# Patient Record
Sex: FEMALE | Race: WHITE | NOT HISPANIC OR LATINO | Employment: FULL TIME | URBAN - METROPOLITAN AREA
[De-identification: names, ages, dates, MRNs, and addresses within clinical notes are randomized per-mention and may not be internally consistent; named-entity substitution may affect disease eponyms.]

---

## 2019-09-09 ENCOUNTER — OFFICE VISIT (OUTPATIENT)
Dept: FAMILY MEDICINE | Facility: CLINIC | Age: 24
End: 2019-09-09
Payer: COMMERCIAL

## 2019-09-09 VITALS
WEIGHT: 157.5 LBS | HEIGHT: 63 IN | SYSTOLIC BLOOD PRESSURE: 120 MMHG | HEART RATE: 74 BPM | TEMPERATURE: 98.4 F | DIASTOLIC BLOOD PRESSURE: 78 MMHG | OXYGEN SATURATION: 99 % | BODY MASS INDEX: 27.91 KG/M2 | RESPIRATION RATE: 16 BRPM

## 2019-09-09 DIAGNOSIS — F33.9 EPISODE OF RECURRENT MAJOR DEPRESSIVE DISORDER, UNSPECIFIED DEPRESSION EPISODE SEVERITY (H): ICD-10-CM

## 2019-09-09 DIAGNOSIS — Z11.4 ENCOUNTER FOR SCREENING FOR HIV: ICD-10-CM

## 2019-09-09 DIAGNOSIS — K59.00 CONSTIPATION, UNSPECIFIED CONSTIPATION TYPE: ICD-10-CM

## 2019-09-09 DIAGNOSIS — Z13.220 LIPID SCREENING: ICD-10-CM

## 2019-09-09 DIAGNOSIS — G44.209 TENSION HEADACHE: ICD-10-CM

## 2019-09-09 DIAGNOSIS — E28.2 PCOS (POLYCYSTIC OVARIAN SYNDROME): Primary | ICD-10-CM

## 2019-09-09 LAB — HBA1C MFR BLD: 5.4 % (ref 0–5.6)

## 2019-09-09 PROCEDURE — 87389 HIV-1 AG W/HIV-1&-2 AB AG IA: CPT | Performed by: FAMILY MEDICINE

## 2019-09-09 PROCEDURE — 36415 COLL VENOUS BLD VENIPUNCTURE: CPT | Performed by: FAMILY MEDICINE

## 2019-09-09 PROCEDURE — 80061 LIPID PANEL: CPT | Performed by: FAMILY MEDICINE

## 2019-09-09 PROCEDURE — 83036 HEMOGLOBIN GLYCOSYLATED A1C: CPT | Performed by: FAMILY MEDICINE

## 2019-09-09 PROCEDURE — 80048 BASIC METABOLIC PNL TOTAL CA: CPT | Performed by: FAMILY MEDICINE

## 2019-09-09 PROCEDURE — 99204 OFFICE O/P NEW MOD 45 MIN: CPT | Performed by: FAMILY MEDICINE

## 2019-09-09 RX ORDER — AMITRIPTYLINE HYDROCHLORIDE 10 MG/1
10 TABLET ORAL 2 TIMES DAILY PRN
COMMUNITY
End: 2019-09-09

## 2019-09-09 RX ORDER — CITALOPRAM HYDROBROMIDE 40 MG/1
40 TABLET ORAL DAILY
COMMUNITY
End: 2019-11-18

## 2019-09-09 RX ORDER — SPIRONOLACTONE 50 MG/1
50 TABLET, FILM COATED ORAL DAILY
COMMUNITY
End: 2019-11-18

## 2019-09-09 RX ORDER — AMITRIPTYLINE HYDROCHLORIDE 10 MG/1
10 TABLET ORAL AT BEDTIME
Qty: 30 TABLET | Refills: 1
Start: 2019-09-09 | End: 2019-11-18

## 2019-09-09 RX ORDER — CITALOPRAM HYDROBROMIDE 10 MG/1
10 TABLET ORAL DAILY
Start: 2019-09-09 | End: 2019-11-18

## 2019-09-09 RX ORDER — DROSPIRENONE AND ETHINYL ESTRADIOL 0.02-3(28)
1 KIT ORAL DAILY
Qty: 84 TABLET | Refills: 3 | Status: SHIPPED | OUTPATIENT
Start: 2019-09-09 | End: 2020-08-18

## 2019-09-09 RX ORDER — SPIRONOLACTONE 50 MG/1
50 TABLET, FILM COATED ORAL DAILY
Qty: 90 TABLET | Refills: 1 | Status: SHIPPED | OUTPATIENT
Start: 2019-09-09 | End: 2020-03-11

## 2019-09-09 RX ORDER — DROSPIRENONE AND ETHINYL ESTRADIOL 0.02-3(28)
1 KIT ORAL DAILY
COMMUNITY
End: 2019-11-18

## 2019-09-09 ASSESSMENT — ANXIETY QUESTIONNAIRES
5. BEING SO RESTLESS THAT IT IS HARD TO SIT STILL: NOT AT ALL
1. FEELING NERVOUS, ANXIOUS, OR ON EDGE: SEVERAL DAYS
IF YOU CHECKED OFF ANY PROBLEMS ON THIS QUESTIONNAIRE, HOW DIFFICULT HAVE THESE PROBLEMS MADE IT FOR YOU TO DO YOUR WORK, TAKE CARE OF THINGS AT HOME, OR GET ALONG WITH OTHER PEOPLE: SOMEWHAT DIFFICULT
GAD7 TOTAL SCORE: 7
3. WORRYING TOO MUCH ABOUT DIFFERENT THINGS: MORE THAN HALF THE DAYS
6. BECOMING EASILY ANNOYED OR IRRITABLE: SEVERAL DAYS
2. NOT BEING ABLE TO STOP OR CONTROL WORRYING: MORE THAN HALF THE DAYS
7. FEELING AFRAID AS IF SOMETHING AWFUL MIGHT HAPPEN: NOT AT ALL

## 2019-09-09 ASSESSMENT — PATIENT HEALTH QUESTIONNAIRE - PHQ9
SUM OF ALL RESPONSES TO PHQ QUESTIONS 1-9: 5
5. POOR APPETITE OR OVEREATING: SEVERAL DAYS

## 2019-09-09 ASSESSMENT — MIFFLIN-ST. JEOR: SCORE: 1419.58

## 2019-09-09 NOTE — LETTER
September 10, 2019      Jessica Sonia  [address]  [facility] [address]        Dear ,    We are writing to inform you of your test results.    Here are your results for your recent lab which was normal. Please call or message me if you have questions or concerns.     Resulted Orders   Hemoglobin A1c   Result Value Ref Range    Hemoglobin A1C 5.4 0 - 5.6 %      Comment:      Normal <5.7% Prediabetes 5.7-6.4%  Diabetes 6.5% or higher - adopted from ADA   consensus guidelines.         If you have any questions or concerns, please call the clinic at the number listed above.       Sincerely,        Emily Guo MD/nr

## 2019-09-09 NOTE — PROGRESS NOTES
"Subjective     Jessica Scott is a 24 year old female who presents to clinic today for the following health issues:    HPI   Establish care    MDD - She has been taking Celexa for around 4 years. She feels that symptoms are controlled. She takes medication once/month as it makes her feels sick. She has been taking it once/month for around 2 years. Symptoms controlled.   PCOS - She is currently taking spironolactone and metformin. Normal menstrua cycles.   She endorses BM twice/week. Stools are hard.     Tension headaches - She takes elavil 10 mg once every few months.   No missed doses for birth control. No history of migraines or DVT.     Reviewed and updated as needed this visit by Provider        Reviewed PMH, PSH, FH, Medication and Allergies.     Review of Systems   ROS COMP: Constitutional, HEENT, cardiovascular, pulmonary, GI, , musculoskeletal, neuro, skin, endocrine and psych systems are negative, except as otherwise noted.      Objective    There were no vitals taken for this visit.  There is no height or weight on file to calculate BMI.  Physical Exam   /78   Pulse 74   Temp 98.4  F (36.9  C) (Oral)   Resp 16   Ht 1.594 m (5' 2.75\")   Wt 71.4 kg (157 lb 8 oz)   LMP 08/23/2019 (Exact Date)   SpO2 99%   BMI 28.12 kg/m    GENERAL: healthy, alert and no distress  EYES: Eyes grossly normal to inspection  HENT: nose and mouth without ulcers or lesions  RESP: non labored   MS: no gross musculoskeletal defects noted, no edema  SKIN: no suspicious lesions or rashes  NEURO: Normal strength and tone, mentation intact and speech normal  PSYCH: mentation appears normal, affect normal    Diagnostic Test Results:  none         Assessment & Plan     1. PCOS (polycystic ovarian syndrome)  - Basic metabolic panel  - metFORMIN (GLUCOPHAGE) 500 MG tablet; Take 1 tablet (500 mg) by mouth daily (with breakfast)  Dispense: 90 tablet; Refill: 1  - spironolactone (ALDACTONE) 50 MG tablet; Take 1 tablet (50 mg) by " mouth daily  Dispense: 90 tablet; Refill: 1  - Hemoglobin A1c    2. Birth control  - drospirenone-ethinyl estradiol (SUN) 3-0.02 MG tablet; Take 1 tablet by mouth daily  Dispense: 84 tablet; Refill: 3    3. Episode of recurrent major depressive disorder, unspecified depression episode severity (H)  PHQ-9 SCORE 9/9/2019   PHQ-9 Total Score 5     - Pt is currently taking Celexa 40 mg once/week. We discussed that Celexa is a daily medication and she is currently taking around 1 mg/day. I offered to start her on 5 mg daily, patient requested on 10 mg daily. Advised to follow up in one month.   - citalopram (CELEXA) 10 MG tablet; Take 1 tablet (10 mg) by mouth daily    4. Tension headache  - Pt is currently taking Elavil one tablet every few months. We discussed again that elavil is more daily medication. If headaches reoccur I recommended rest, fluids, heat pad and excedrin migraine PRN.   - amitriptyline (ELAVIL) 10 MG tablet; Take 1 tablet (10 mg) by mouth At Bedtime  Dispense: 30 tablet; Refill: 1    5. Lipid screening  - Lipid Profile (Chol, Trig, HDL, LDL calc)    6. Encounter for screening for HIV  - HIV Antigen Antibody Combo    7. Constipation  - advised below  Increase vegetables, fruit and water intake. If symptoms do not improve then you can try metamucil daily.     Pt due for pap. She'll follow up in one month for physical.     Emily Guo MD  [facility]

## 2019-09-09 NOTE — PATIENT INSTRUCTIONS
Celexa decrease to 10 mg daily     Increase vegetables, fruit and water intake. If symptoms do not improve then you can try metamucil daily.     Headache - You can try Excedrin migraine if you do have headaches.

## 2019-09-10 LAB
ANION GAP SERPL CALCULATED.3IONS-SCNC: 7 MMOL/L (ref 3–14)
BUN SERPL-MCNC: 8 MG/DL (ref 7–30)
CALCIUM SERPL-MCNC: 9.4 MG/DL (ref 8.5–10.1)
CHLORIDE SERPL-SCNC: 105 MMOL/L (ref 94–109)
CHOLEST SERPL-MCNC: 207 MG/DL
CO2 SERPL-SCNC: 26 MMOL/L (ref 20–32)
CREAT SERPL-MCNC: 0.76 MG/DL (ref 0.52–1.04)
GFR SERPL CREATININE-BSD FRML MDRD: >90 ML/MIN/{1.73_M2}
GLUCOSE SERPL-MCNC: 88 MG/DL (ref 70–99)
HDLC SERPL-MCNC: 52 MG/DL
HIV 1+2 AB+HIV1 P24 AG SERPL QL IA: NONREACTIVE
LDLC SERPL CALC-MCNC: 111 MG/DL
NONHDLC SERPL-MCNC: 155 MG/DL
POTASSIUM SERPL-SCNC: 3.7 MMOL/L (ref 3.4–5.3)
SODIUM SERPL-SCNC: 138 MMOL/L (ref 133–144)
TRIGL SERPL-MCNC: 222 MG/DL

## 2019-09-10 ASSESSMENT — ANXIETY QUESTIONNAIRES: GAD7 TOTAL SCORE: 7

## 2019-11-18 ENCOUNTER — OFFICE VISIT (OUTPATIENT)
Dept: FAMILY MEDICINE | Facility: CLINIC | Age: 24
End: 2019-11-18
Payer: COMMERCIAL

## 2019-11-18 VITALS
TEMPERATURE: 98.4 F | OXYGEN SATURATION: 97 % | DIASTOLIC BLOOD PRESSURE: 58 MMHG | BODY MASS INDEX: 26.78 KG/M2 | SYSTOLIC BLOOD PRESSURE: 102 MMHG | WEIGHT: 150 LBS | HEART RATE: 74 BPM

## 2019-11-18 DIAGNOSIS — G44.209 TENSION HEADACHE: ICD-10-CM

## 2019-11-18 DIAGNOSIS — F33.9 EPISODE OF RECURRENT MAJOR DEPRESSIVE DISORDER, UNSPECIFIED DEPRESSION EPISODE SEVERITY (H): ICD-10-CM

## 2019-11-18 DIAGNOSIS — Z01.419 ENCOUNTER FOR GYNECOLOGICAL EXAMINATION WITHOUT ABNORMAL FINDING: ICD-10-CM

## 2019-11-18 PROCEDURE — 99214 OFFICE O/P EST MOD 30 MIN: CPT | Performed by: FAMILY MEDICINE

## 2019-11-18 PROCEDURE — G0145 SCR C/V CYTO,THINLAYER,RESCR: HCPCS | Performed by: FAMILY MEDICINE

## 2019-11-18 RX ORDER — CITALOPRAM HYDROBROMIDE 10 MG/1
10 TABLET ORAL DAILY
Qty: 90 TABLET | Refills: 1 | Status: SHIPPED | OUTPATIENT
Start: 2019-11-18 | End: 2020-08-18

## 2019-11-18 ASSESSMENT — PATIENT HEALTH QUESTIONNAIRE - PHQ9
10. IF YOU CHECKED OFF ANY PROBLEMS, HOW DIFFICULT HAVE THESE PROBLEMS MADE IT FOR YOU TO DO YOUR WORK, TAKE CARE OF THINGS AT HOME, OR GET ALONG WITH OTHER PEOPLE: SOMEWHAT DIFFICULT
SUM OF ALL RESPONSES TO PHQ QUESTIONS 1-9: 8
SUM OF ALL RESPONSES TO PHQ QUESTIONS 1-9: 8

## 2019-11-18 ASSESSMENT — ANXIETY QUESTIONNAIRES
GAD7 TOTAL SCORE: 6
7. FEELING AFRAID AS IF SOMETHING AWFUL MIGHT HAPPEN: NOT AT ALL
2. NOT BEING ABLE TO STOP OR CONTROL WORRYING: SEVERAL DAYS
6. BECOMING EASILY ANNOYED OR IRRITABLE: MORE THAN HALF THE DAYS
1. FEELING NERVOUS, ANXIOUS, OR ON EDGE: SEVERAL DAYS
GAD7 TOTAL SCORE: 6
4. TROUBLE RELAXING: SEVERAL DAYS
GAD7 TOTAL SCORE: 6
7. FEELING AFRAID AS IF SOMETHING AWFUL MIGHT HAPPEN: NOT AT ALL
5. BEING SO RESTLESS THAT IT IS HARD TO SIT STILL: NOT AT ALL
3. WORRYING TOO MUCH ABOUT DIFFERENT THINGS: SEVERAL DAYS

## 2019-11-18 NOTE — PROGRESS NOTES
Subjective     Jessica Scott is a 24 year old female who presents to clinic today for the following health issues:    HPI     MDD - She is currently taking Celexa 10 mg daily. No side effects.   Headaches - When she does get them, she'll take an Excedrin which helps.   Ok for influenza vaccine.   No previous abnormal pap smears.     Reviewed and updated as needed this visit by Provider         Review of Systems   ROS COMP: Constitutional, HEENT, cardiovascular, pulmonary, gi and gu systems are negative, except as otherwise noted.      Objective    There were no vitals taken for this visit.  There is no height or weight on file to calculate BMI.  Physical Exam   /58 (BP Location: Left arm, Patient Position: Sitting, Cuff Size: Adult Regular)   Pulse 74   Temp 98.4  F (36.9  C) (Oral)   Wt 68 kg (150 lb)   LMP 11/15/2019   SpO2 97%   BMI 26.78 kg/m    GENERAL: healthy, alert and no distress  EYES: Eyes grossly normal to inspection  HENT:nose and mouth without ulcers or lesions   (female): normal female external genitalia, normal urethral meatus, vaginal mucosa, normal cervix without masses or discharge  PSYCH: mentation appears normal, affect normal    Diagnostic Test Results:  Labs reviewed in Epic        Assessment & Plan     1. Episode of recurrent major depressive disorder, unspecified depression episode severity (H)  - stable, continue current regimen   - citalopram (CELEXA) 10 MG tablet; Take 1 tablet (10 mg) by mouth daily  Dispense: 90 tablet; Refill: 1    2. Encounter for gynecological examination without abnormal finding  - Pap imaged thin layer screen reflex to HPV if ASCUS - recommend age 25 - 29    3. Tension headaches  - stable, continue current as needed medication     Emily Guo MD  [facility]      Answers for HPI/ROS submitted by the patient on 11/18/2019   If you checked off any problems, how difficult have these problems made it for you to do your work, take  care of things at home, or get along with other people?: Somewhat difficult  PHQ9 TOTAL SCORE: 8  DANISH 7 TOTAL SCORE: 6

## 2019-11-19 ASSESSMENT — ANXIETY QUESTIONNAIRES: GAD7 TOTAL SCORE: 6

## 2019-11-20 LAB
COPATH REPORT: NORMAL
PAP: NORMAL

## 2020-03-06 DIAGNOSIS — E28.2 PCOS (POLYCYSTIC OVARIAN SYNDROME): ICD-10-CM

## 2020-03-06 NOTE — TELEPHONE ENCOUNTER
"Requested Prescriptions   Pending Prescriptions Disp Refills     metFORMIN (GLUCOPHAGE) 500 MG tablet [Pharmacy Med Name: METFORMIN HCL 500MG TABS]  Last Written Prescription Date:  9/9/2019  Last Fill Quantity: 90 tabs,  # refills: 1   Last office visit: 11/18/2019 with prescribing provider:  Sari   Future Office Visit:   90 tablet 1     Sig: TAKE ONE TABLET BY MOUTH ONCE DAILY WITH BREAKFAST       Biguanide Agents Failed - 3/6/2020 11:02 AM        Failed - Patient has had a Microalbumin in the past 15 mos.     No lab results found.          Passed - Blood pressure less than 140/90 in past 6 months     BP Readings from Last 3 Encounters:   11/18/19 102/58   09/09/19 120/78                 Passed - Patient has documented LDL within the past 12 mos.     Recent Labs   Lab Test 09/09/19  1558   *             Passed - Patient is age 10 or older        Passed - Patient has documented A1c within the specified period of time.     If HgbA1C is 8 or greater, it needs to be on file within the past 3 months.  If less than 8, must be on file within the past 6 months.     Recent Labs   Lab Test 09/09/19  1558   A1C 5.4             Passed - Patient's CR is NOT>1.4 OR Patient's EGFR is NOT<45 within past 12 mos.     Recent Labs   Lab Test 09/09/19  1558   GFRESTIMATED >90   GFRESTBLACK >90       Recent Labs   Lab Test 09/09/19  1558   CR 0.76             Passed - Patient does NOT have a diagnosis of CHF.        Passed - Medication is active on med list        Passed - Patient is not pregnant        Passed - Patient has not had a positive pregnancy test within the past 12 mos.         Passed - Recent (6 mo) or future (30 days) visit within the authorizing provider's specialty     Patient had office visit in the last 6 months or has a visit in the next 30 days with authorizing provider or within the authorizing provider's specialty.  See \"Patient Info\" tab in inbasket, or \"Choose Columns\" in Meds & Orders section of the " "refill encounter.            spironolactone (ALDACTONE) 50 MG tablet [Pharmacy Med Name: SPIRONOLACTONE 50MG TABS]  Last Written Prescription Date:  9/9/2019  Last Fill Quantity: 90 tabs,  # refills: 1   Last office visit: 11/18/2019 with prescribing provider:  Sari   Future Office Visit:   90 tablet 1     Sig: TAKE ONE TABLET BY MOUTH ONCE DAILY       Diuretics (Including Combos) Protocol Passed - 3/6/2020 11:02 AM        Passed - Blood pressure under 140/90 in past 12 months     BP Readings from Last 3 Encounters:   11/18/19 102/58   09/09/19 120/78                 Passed - Recent (12 mo) or future (30 days) visit within the authorizing provider's specialty     Patient has had an office visit with the authorizing provider or a provider within the authorizing providers department within the previous 12 mos or has a future within next 30 days. See \"Patient Info\" tab in inbasket, or \"Choose Columns\" in Meds & Orders section of the refill encounter.              Passed - Medication is active on med list        Passed - Patient is age 18 or older        Passed - No active pregancy on record        Passed - Normal serum creatinine on file in past 12 months     Recent Labs   Lab Test 09/09/19  1558   CR 0.76              Passed - Normal serum potassium on file in past 12 months     Recent Labs   Lab Test 09/09/19  1558   POTASSIUM 3.7                    Passed - Normal serum sodium on file in past 12 months     Recent Labs   Lab Test 09/09/19  1558                 Passed - No positive pregnancy test in past 12 months         "

## 2020-03-11 ENCOUNTER — HEALTH MAINTENANCE LETTER (OUTPATIENT)
Age: 25
End: 2020-03-11

## 2020-03-11 RX ORDER — SPIRONOLACTONE 50 MG/1
TABLET, FILM COATED ORAL
Qty: 90 TABLET | Refills: 0 | Status: SHIPPED | OUTPATIENT
Start: 2020-03-11 | End: 2020-06-23

## 2020-06-04 ENCOUNTER — TELEPHONE (OUTPATIENT)
Dept: FAMILY MEDICINE | Facility: CLINIC | Age: 25
End: 2020-06-04

## 2020-06-04 DIAGNOSIS — Z29.9 PREVENTIVE MEASURE: Primary | ICD-10-CM

## 2020-06-04 NOTE — TELEPHONE ENCOUNTER
Reason for Call:  Other call back    Detailed comments: pt called in and stated she would like a lab order so she can get her magnesium level checked     Phone Number Patient can be reached at: Cell number on file:    Telephone Information:   Mobile [phone]       Best Time: ASAP    Can we leave a detailed message on this number? YES    Call taken on 6/4/2020 at 1:41 PM by KASSIE Hutchinson  [place]   Patient Rep

## 2020-06-05 DIAGNOSIS — E28.2 PCOS (POLYCYSTIC OVARIAN SYNDROME): ICD-10-CM

## 2020-06-05 LAB
ANION GAP SERPL CALCULATED.3IONS-SCNC: 9 MMOL/L (ref 3–14)
BUN SERPL-MCNC: 9 MG/DL (ref 7–30)
CALCIUM SERPL-MCNC: 8.7 MG/DL (ref 8.5–10.1)
CHLORIDE SERPL-SCNC: 104 MMOL/L (ref 94–109)
CO2 SERPL-SCNC: 25 MMOL/L (ref 20–32)
CREAT SERPL-MCNC: 0.8 MG/DL (ref 0.52–1.04)
GFR SERPL CREATININE-BSD FRML MDRD: >90 ML/MIN/{1.73_M2}
GLUCOSE SERPL-MCNC: 86 MG/DL (ref 70–99)
POTASSIUM SERPL-SCNC: 3.7 MMOL/L (ref 3.4–5.3)
SODIUM SERPL-SCNC: 138 MMOL/L (ref 133–144)

## 2020-06-05 PROCEDURE — 36415 COLL VENOUS BLD VENIPUNCTURE: CPT | Performed by: FAMILY MEDICINE

## 2020-06-05 PROCEDURE — 80048 BASIC METABOLIC PNL TOTAL CA: CPT | Performed by: FAMILY MEDICINE

## 2020-06-05 NOTE — TELEPHONE ENCOUNTER
Please let pt know DR VALDIVIA signed off on the mag level  And she can do a lab only  Thanks!     Svetlana Sanches RN

## 2020-06-16 DIAGNOSIS — Z29.9 PREVENTIVE MEASURE: ICD-10-CM

## 2020-06-16 LAB — MAGNESIUM SERPL-MCNC: 1.9 MG/DL (ref 1.6–2.3)

## 2020-06-16 PROCEDURE — 83735 ASSAY OF MAGNESIUM: CPT | Performed by: FAMILY MEDICINE

## 2020-06-16 PROCEDURE — 36415 COLL VENOUS BLD VENIPUNCTURE: CPT | Performed by: FAMILY MEDICINE

## 2020-06-19 DIAGNOSIS — E28.2 PCOS (POLYCYSTIC OVARIAN SYNDROME): ICD-10-CM

## 2020-06-23 RX ORDER — SPIRONOLACTONE 50 MG/1
TABLET, FILM COATED ORAL
Qty: 90 TABLET | Refills: 0 | Status: SHIPPED | OUTPATIENT
Start: 2020-06-23 | End: 2020-08-18

## 2020-06-23 NOTE — TELEPHONE ENCOUNTER
Routing refill request to provider for review/approval because:  --For metformin - associated diagnosis is PCOS but [facility] is requesting q six month office visit and a1c.  --Plan in last office visit 11/18/19 was to RTC in six months.       ,  --Please contact patient and ask her to schedule a virtual visit as planned in last office visit..    --A refill request for medication was sent to the provider.

## 2020-08-18 ENCOUNTER — VIRTUAL VISIT (OUTPATIENT)
Dept: FAMILY MEDICINE | Facility: CLINIC | Age: 25
End: 2020-08-18
Payer: COMMERCIAL

## 2020-08-18 VITALS — HEIGHT: 63 IN | BODY MASS INDEX: 26.58 KG/M2 | WEIGHT: 150 LBS

## 2020-08-18 DIAGNOSIS — E28.2 PCOS (POLYCYSTIC OVARIAN SYNDROME): ICD-10-CM

## 2020-08-18 DIAGNOSIS — F33.9 EPISODE OF RECURRENT MAJOR DEPRESSIVE DISORDER, UNSPECIFIED DEPRESSION EPISODE SEVERITY (H): ICD-10-CM

## 2020-08-18 PROCEDURE — 99214 OFFICE O/P EST MOD 30 MIN: CPT | Mod: 95 | Performed by: NURSE PRACTITIONER

## 2020-08-18 PROCEDURE — 96127 BRIEF EMOTIONAL/BEHAV ASSMT: CPT | Performed by: NURSE PRACTITIONER

## 2020-08-18 RX ORDER — DROSPIRENONE AND ETHINYL ESTRADIOL 0.02-3(28)
1 KIT ORAL DAILY
Qty: 84 TABLET | Refills: 3 | Status: SHIPPED | OUTPATIENT
Start: 2020-08-18 | End: 2021-07-23

## 2020-08-18 RX ORDER — CITALOPRAM HYDROBROMIDE 10 MG/1
10 TABLET ORAL DAILY
Qty: 90 TABLET | Refills: 3 | Status: SHIPPED | OUTPATIENT
Start: 2020-08-18 | End: 2021-09-22

## 2020-08-18 RX ORDER — SPIRONOLACTONE 50 MG/1
50 TABLET, FILM COATED ORAL DAILY
Qty: 90 TABLET | Refills: 3 | Status: SHIPPED | OUTPATIENT
Start: 2020-08-18 | End: 2021-09-22

## 2020-08-18 ASSESSMENT — PATIENT HEALTH QUESTIONNAIRE - PHQ9: SUM OF ALL RESPONSES TO PHQ QUESTIONS 1-9: 1

## 2020-08-18 ASSESSMENT — MIFFLIN-ST. JEOR: SCORE: 1380.56

## 2020-08-18 NOTE — PROGRESS NOTES
"Jessica Scott is a 25 year old female who is being evaluated via a billable telephone visit.      The patient has been notified of following:     \"This telephone visit will be conducted via a call between you and your physician/provider. We have found that certain health care needs can be provided without the need for a physical exam.  This service lets us provide the care you need with a short phone conversation.  If a prescription is necessary we can send it directly to your pharmacy.  If lab work is needed we can place an order for that and you can then stop by our lab to have the test done at a later time.    Telephone visits are billed at different rates depending on your insurance coverage. During this emergency period, for some insurers they may be billed the same as an in-person visit.  Please reach out to your insurance provider with any questions.    If during the course of the call the physician/provider feels a telephone visit is not appropriate, you will not be charged for this service.\"    Patient has given verbal consent for Telephone visit?  Yes    What phone number would you like to be contacted at? [phone]     How would you like to obtain your AVS? Jarad Jacobo     Jessica Scott is a 25 year old female who presents via phone visit today for the following health issues:    HPI  Chief Complaint   Patient presents with     Abnormal Bleeding Problem     PCOS     Depression       Jessica has been diagnosed with PCOS a few years ago when she went through a time that she did not have her period for several months in a row.  She has been taking the spironolactone and metformin.  Since she has been on this medication regimen, her periods have been much more regular.  At her clinic appointment 1 year ago with primary care, birth control pills were added for contraception.  She has been taking all of her medications without difficulty.  She denies any side effects or problems.  Her periods " are short, 3-4 days ago.  She is in a monogamous relationship.    Depression and mood:  She has been on citalopram for more than 1 year for depression.  This is been very helpful.  She denies any acute symptoms today.  She is requesting refills of the citalopram today.    She denies any current illness.  She feels healthy today.    There is no problem list on file for this patient.    History reviewed. No pertinent surgical history.    Social History     Tobacco Use     Smoking status: Former Smoker     Smokeless tobacco: Never Used   Substance Use Topics     Alcohol use: Yes     Family History   Problem Relation Age of Onset     Melanoma Mother      Migraines Mother      Arthritis Mother      Depression Father      Arthritis Father      Depression Sister          Current Outpatient Medications   Medication Sig Dispense Refill     citalopram (CELEXA) 10 MG tablet Take 1 tablet (10 mg) by mouth daily 90 tablet 3     drospirenone-ethinyl estradiol (SUN) 3-0.02 MG tablet Take 1 tablet by mouth daily 84 tablet 3     metFORMIN (GLUCOPHAGE) 500 MG tablet Take 1 tablet (500 mg) by mouth daily (with breakfast) 90 tablet 3     spironolactone (ALDACTONE) 50 MG tablet Take 1 tablet (50 mg) by mouth daily 90 tablet 3     Allergies   Allergen Reactions     Ceclor [Cefaclor]      Hives      Recent Labs   Lab Test 06/05/20  0715 09/09/19  1558   A1C  --  5.4   LDL  --  111*   HDL  --  52   TRIG  --  222*   CR 0.80 0.76   GFRESTIMATED >90 >90   GFRESTBLACK >90 >90   POTASSIUM 3.7 3.7      BP Readings from Last 3 Encounters:   11/18/19 102/58   09/09/19 120/78    Wt Readings from Last 3 Encounters:   08/18/20 68 kg (150 lb)   11/18/19 68 kg (150 lb)   09/09/19 71.4 kg (157 lb 8 oz)                    Reviewed and updated as needed this visit by Provider  Tobacco  Allergies  Meds  Problems  Med Hx  Surg Hx  Fam Hx         Review of Systems   Constitutional, HEENT, cardiovascular, pulmonary, GI, , musculoskeletal, neuro,  "skin, endocrine and psych systems are negative, except as otherwise noted.       Objective          Vitals:  No vitals were obtained today due to virtual visit.    healthy, alert and no distress  PSYCH: Alert and oriented times 3; coherent speech, normal   rate and volume, able to articulate logical thoughts, able   to abstract reason, no tangential thoughts, no hallucinations   or delusions  Her affect is normal  RESP: No cough, no audible wheezing, able to talk in full sentences  Remainder of exam unable to be completed due to telephone visits    Diagnostic Test Results:  Labs reviewed in Epic        Assessment/Plan:    Assessment & Plan     (E28.2) PCOS (polycystic ovarian syndrome)  Comment: Chronic/controlled  Plan: metFORMIN (GLUCOPHAGE) 500 MG tablet,         spironolactone (ALDACTONE) 50 MG tablet        I did go ahead and give her refills of her medication for 1 year.  She is to continue to take her medications as prescribed.  She did have labs drawn in June 2020, and everything was normal.  With her next clinic appointment in 1 year, she is to anticipate additional lab studies at that time.  She is to be rechecked sooner if any problems.    (F33.9) Episode of recurrent major depressive disorder, unspecified depression episode severity (H)  Comment: Improved   plan: citalopram (CELEXA) 10 MG tablet        Refills done and healthy self cares encouraged today.  She is to continue her mental health management she is to follow-up with primary care in 6 months for recheck, sooner if any symptoms are worsening.       BMI:   Estimated body mass index is 26.78 kg/m  as calculated from the following:    Height as of this encounter: 1.594 m (5' 2.75\").    Weight as of this encounter: 68 kg (150 lb).   Weight management plan: Discussed healthy diet and exercise guidelines        See Patient Instructions    Return in about 6 months (around 2/18/2021) for Medication follow up.    Nidhi Núñez, FRANCISCO J " [facility]    Phone call duration:  12 minutes

## 2020-10-19 ENCOUNTER — NURSE TRIAGE (OUTPATIENT)
Dept: NURSING | Facility: CLINIC | Age: 25
End: 2020-10-19

## 2020-10-19 NOTE — TELEPHONE ENCOUNTER
"Jessica reports that her sister in law was identified as close contact to COVID. Asks for advise.    Sister in law - since she is a close contact, must quarantine for 14 days from the day of exposure. Testing is recommended.    Recommendation for Jessica:  \"What if I have been around someone who was identified as a close contact?  If you have been around someone who was identified as a close contact to a person with COVID-19, closely monitor yourself for any symptoms of COVID-19. You do not need to self-quarantine unless you develop symptoms or if the person identified as a close contact develops COVID-19.\"    https://www.cdc.gov/coronavirus/2019-ncov/faq.html    She verbalized understanding.    Gwendolyn Mckeon RN/[place] Nurse Advisor        Reason for Disposition    [1] COVID-19 EXPOSURE (Close Contact) AND [2] within last 14 days BUT [3] NO symptoms    Protocols used: CORONAVIRUS (COVID-19) EXPOSURE-A- 8.4.20      "

## 2020-11-24 ENCOUNTER — OFFICE VISIT (OUTPATIENT)
Dept: URGENT CARE | Facility: URGENT CARE | Age: 25
End: 2020-11-24
Payer: COMMERCIAL

## 2020-11-24 VITALS
RESPIRATION RATE: 12 BRPM | TEMPERATURE: 98.7 F | HEART RATE: 80 BPM | DIASTOLIC BLOOD PRESSURE: 74 MMHG | SYSTOLIC BLOOD PRESSURE: 110 MMHG

## 2020-11-24 DIAGNOSIS — L08.9 CAT BITE OF LEFT HAND INCLUDING FINGERS WITH INFECTION, INITIAL ENCOUNTER: Primary | ICD-10-CM

## 2020-11-24 DIAGNOSIS — S61.452A CAT BITE OF LEFT HAND, INITIAL ENCOUNTER: ICD-10-CM
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
## 2020-11-24 DIAGNOSIS — S61.452A CAT BITE OF LEFT HAND INCLUDING FINGERS WITH INFECTION, INITIAL ENCOUNTER: Primary | ICD-10-CM
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
## 2020-11-24 PROCEDURE — 99214 OFFICE O/P EST MOD 30 MIN: CPT | Performed by: INTERNAL MEDICINE

## 2020-11-24 ASSESSMENT — ENCOUNTER SYMPTOMS: FEVER: 0

## 2020-11-24 NOTE — PROGRESS NOTES
SUBJECTIVE:   Jessica Scott is a 25 year old female presenting with a chief complaint of   Chief Complaint   Patient presents with     Urgent Care     Cat Bite     broke up cat fight this morning, her cat  bit her left hand, infected. td 2018       She is an established patient of [facility].    Bite    Onset of symptoms was this morning  Course of illness is worsening.      Location: left index/hand   Context: her cat bit her during cat fight  Current and Associated symptoms: redness, warmth, swelling and pain  Slight drainage from 3 puncture wounds  Treatment measures tried include: antibiotics and washed  iced  Relief from treatment: none      Review of Systems   Constitutional: Negative for fever.       Past Medical History:   Diagnosis Date     Depressive disorder      PCOS (polycystic ovarian syndrome)      Family History   Problem Relation Age of Onset     Melanoma Mother      Migraines Mother      Arthritis Mother      Depression Father      Arthritis Father      Depression Sister      Current Outpatient Medications   Medication Sig Dispense Refill     amoxicillin-clavulanate (AUGMENTIN) 875-125 MG tablet Take 1 tablet by mouth 2 times daily for 10 days 20 tablet 0     citalopram (CELEXA) 10 MG tablet Take 1 tablet (10 mg) by mouth daily 90 tablet 3     drospirenone-ethinyl estradiol (SUN) 3-0.02 MG tablet Take 1 tablet by mouth daily 84 tablet 3     metFORMIN (GLUCOPHAGE) 500 MG tablet Take 1 tablet (500 mg) by mouth daily (with breakfast) 90 tablet 3     spironolactone (ALDACTONE) 50 MG tablet Take 1 tablet (50 mg) by mouth daily 90 tablet 3     Social History     Tobacco Use     Smoking status: Former Smoker     Smokeless tobacco: Never Used   Substance Use Topics     Alcohol use: Yes       OBJECTIVE  /74   Pulse 80   Temp 98.7  F (37.1  C) (Oral)   Resp 12     Physical Exam  Vitals signs reviewed.   Constitutional:       Appearance: Normal appearance.   Musculoskeletal:      Comments: left  hand  Swelling with tenderness & redness overlaying 2nd MCP joint with 3 punctures near joint  2 scratches also noted     Neurological:      Mental Status: She is alert.               Labs:  No results found for this or any previous visit (from the past 24 hour(s)).        ASSESSMENT:      ICD-10-CM    1. Cat bite of left hand including fingers with infection, initial encounter  S61.452A     L08.9     W55.01XA    2. Cat bite of left hand, initial encounter  S61.452A amoxicillin-clavulanate (AUGMENTIN) 875-125 MG tablet    W55.01XA         Medical Decision Making:  Concern for development of septic joint      Patient Instructions       augmentin 2 x day for 10 days  Chosen to cover pasturella  (rocephin shot not given with allergy to ceclor)    Watch for fever  Elevate hand  Rest/restrict use of hand    Instructed as involving index finger joint area, if worsens, go to ER as may need irrigatino & drainage with orthopedic involvement..        Patient Education     Cat Bite    A cat bite can cause a wound deep enough to break the skin. In such cases, the wound is cleaned and then sometimes closed. If the wound is closed it is usually not closed completely. This is so that fluid can drain if the wound becomes infected. Often the wound is left open to heal. In addition to wound care, a tetanus shot may be given, if needed.  Home care    Wash your hands well with soap and warm water before and after caring for the wound. This helps lower the risk of infection.    Care for the wound as directed. If a dressing was applied to the wound, be sure to change it as directed.    If the wound bleeds, place a clean, soft cloth on the wound. Then firmly apply pressure until the bleeding stops. This may take up to 5 minutes. Don't release the pressure and look at the wound during this time.    Always get medical attention for cat bites on the hand. They are highly likely to become infected.    Most wounds heal within 10 days. But an  infection can occur even with proper treatment. So be sure to check the wound daily for signs of infection (see below).    Antibiotics may be prescribed. These help prevent or treat infection. If you re given antibiotics, take them as directed. Also be sure to complete the medicines.  Rabies prevention  Rabies is a virus that can be carried in certain animals. These can include domestic animals such as cats and dogs. Pets fully vaccinated against rabies (2 shots) are at very low risk of infection. But because human rabies is almost always fatal, any biting pet should be confined for 10 days as an extra precaution. In general, if there is a risk for rabies, the following steps may need to be taken:    If someone s pet cat has bitten you, it should be kept in a secure area for the next 10 days to watch for signs of illness. If the pet owner won t allow this, contact your local animal control center. If the cat becomes ill or dies during that time, contact your local animal control center at once so the animal may be tested for rabies. If the cat stays healthy for the next 10 days, there is no danger of rabies in the animal or you.    If a stray cat bit you, contact your local animal control center. They can give information on capture, quarantine, and animal rabies testing.    If you can t find the animal that bit you in the next 2 days, and if rabies exists in your area, you may need to receive the rabies vaccine series. Call your healthcare provider right away. Or return to the emergency department promptly.    All animal bites should be reported to the local animal control center. If you were not given a form to fill out, you can report this yourself.  Follow-up care  Follow up with your healthcare provider, or as directed.  When to seek medical advice  Call your healthcare provider right away if any of these occur:    Signs of infection:  ? Spreading redness or warmth from the wound  ? Increased pain or  swelling  ? Fever of 100.4 F (38 C) or higher, or as directed by your healthcare provider  ? Colored fluid or pus draining from the wound  ? Enlarged lymph nodes above the area that was bitten, such as lymph nodes in the armpit if you were bitten on the hand or arm. This may be a sign of cat-scratch disease (cat-scratch fever).    Signs of rabies infection:  ? Headache  ? Confusion  ? Strange behavior  ? Increased salivating or drooling  ? Seizure    Decreased ability to move any body part near the bite area    Bleeding that can't be stopped after 5 minutes of firm pressure  Ricky last reviewed this educational content on 5/1/2018 2000-2020 [employer]. [address] [place] [address], [place] [address]. All rights reserved. This information is not intended as a substitute for professional medical care. Always follow your healthcare professional's instructions.

## 2020-11-24 NOTE — LETTER
[facility] [address] [place] [address]  Phone: [phone]    November 24, 2020        Jessica Scott  [address] [facility] [address]          To whom it may concern:    RE: Jessica Scott    Patient was seen and treated today at our clinic.  Please excuse up to 3 days work absence.    Please contact me for questions or concerns.      Sincerely,        Mily Chandler MD

## 2020-11-24 NOTE — PATIENT INSTRUCTIONS
augmentin 2 x day for 10 days  Chosen to cover pasturella  (rocephin shot not given with allergy to ceclor)    Watch for fever  Elevate hand  Rest/restrict use of hand    Instructed as involving index finger joint area, if worsens, go to ER as may need irrigatino & drainage with orthopedic involvement..        Patient Education     Cat Bite    A cat bite can cause a wound deep enough to break the skin. In such cases, the wound is cleaned and then sometimes closed. If the wound is closed it is usually not closed completely. This is so that fluid can drain if the wound becomes infected. Often the wound is left open to heal. In addition to wound care, a tetanus shot may be given, if needed.  Home care    Wash your hands well with soap and warm water before and after caring for the wound. This helps lower the risk of infection.    Care for the wound as directed. If a dressing was applied to the wound, be sure to change it as directed.    If the wound bleeds, place a clean, soft cloth on the wound. Then firmly apply pressure until the bleeding stops. This may take up to 5 minutes. Don't release the pressure and look at the wound during this time.    Always get medical attention for cat bites on the hand. They are highly likely to become infected.    Most wounds heal within 10 days. But an infection can occur even with proper treatment. So be sure to check the wound daily for signs of infection (see below).    Antibiotics may be prescribed. These help prevent or treat infection. If you re given antibiotics, take them as directed. Also be sure to complete the medicines.  Rabies prevention  Rabies is a virus that can be carried in certain animals. These can include domestic animals such as cats and dogs. Pets fully vaccinated against rabies (2 shots) are at very low risk of infection. But because human rabies is almost always fatal, any biting pet should be confined for 10 days as an extra precaution. In general, if  there is a risk for rabies, the following steps may need to be taken:    If someone s pet cat has bitten you, it should be kept in a secure area for the next 10 days to watch for signs of illness. If the pet owner won t allow this, contact your local animal control center. If the cat becomes ill or dies during that time, contact your local animal control center at once so the animal may be tested for rabies. If the cat stays healthy for the next 10 days, there is no danger of rabies in the animal or you.    If a stray cat bit you, contact your local animal control center. They can give information on capture, quarantine, and animal rabies testing.    If you can t find the animal that bit you in the next 2 days, and if rabies exists in your area, you may need to receive the rabies vaccine series. Call your healthcare provider right away. Or return to the emergency department promptly.    All animal bites should be reported to the local animal control center. If you were not given a form to fill out, you can report this yourself.  Follow-up care  Follow up with your healthcare provider, or as directed.  When to seek medical advice  Call your healthcare provider right away if any of these occur:    Signs of infection:  ? Spreading redness or warmth from the wound  ? Increased pain or swelling  ? Fever of 100.4 F (38 C) or higher, or as directed by your healthcare provider  ? Colored fluid or pus draining from the wound  ? Enlarged lymph nodes above the area that was bitten, such as lymph nodes in the armpit if you were bitten on the hand or arm. This may be a sign of cat-scratch disease (cat-scratch fever).    Signs of rabies infection:  ? Headache  ? Confusion  ? Strange behavior  ? Increased salivating or drooling  ? Seizure    Decreased ability to move any body part near the bite area    Bleeding that can't be stopped after 5 minutes of firm pressure  Ricky last reviewed this educational content on 5/1/2018     4852-8448 [employer]. [address] [place] [address], [place] [address]. All rights reserved. This information is not intended as a substitute for professional medical care. Always follow your healthcare professional's instructions.

## 2021-01-03 ENCOUNTER — HEALTH MAINTENANCE LETTER (OUTPATIENT)
Age: 26
End: 2021-01-03

## 2021-02-25 ENCOUNTER — VIRTUAL VISIT (OUTPATIENT)
Dept: FAMILY MEDICINE | Facility: CLINIC | Age: 26
End: 2021-02-25
Payer: COMMERCIAL

## 2021-02-25 DIAGNOSIS — F33.9 EPISODE OF RECURRENT MAJOR DEPRESSIVE DISORDER, UNSPECIFIED DEPRESSION EPISODE SEVERITY (H): Primary | ICD-10-CM

## 2021-02-25 DIAGNOSIS — E28.2 PCOS (POLYCYSTIC OVARIAN SYNDROME): ICD-10-CM

## 2021-02-25 PROCEDURE — 99213 OFFICE O/P EST LOW 20 MIN: CPT | Mod: 95 | Performed by: NURSE PRACTITIONER

## 2021-02-25 ASSESSMENT — PATIENT HEALTH QUESTIONNAIRE - PHQ9: SUM OF ALL RESPONSES TO PHQ QUESTIONS 1-9: 6

## 2021-02-25 NOTE — PROGRESS NOTES
"Collette is a 25 year old who is being evaluated via a billable telephone visit.      What phone number would you like to be contacted at? [phone]   How would you like to obtain your AVS? Jarad    Assessment & Plan     (F33.9) Episode of recurrent major depressive disorder, unspecified depression episode severity (H)  (primary encounter diagnosis)  Comment: Controlled  Plan:    for today, she will continue her citalopram 10 mg once a day.   I did tell her that if she ever wanted to see a psychologist I would be happy to give her that referral.  She declined that referral today.  I encouraged her to continue healthy self cares, eating a healthy diet, adequate rest etc.  She is to follow-up with me    In August 2021 for a physical and medication refills, sooner problems.    (E28.2) PCOS (polycystic ovarian syndrome)  Comment: History of  Plan: OB/GYN REFERRAL        I did go ahead and give her referrals to follow-up with OB/GYN.  She should do that at her convenience.  She will continue her current medications as prescribed and she will follow-up with OB/GYN for additional guidance.  She is appreciative.         BMI:   Estimated body mass index is 26.78 kg/m  as calculated from the following:    Height as of 8/18/20: 1.594 m (5' 2.75\").    Weight as of 8/18/20: 68 kg (150 lb).   Weight management plan: Discussed healthy diet and exercise guidelines    See Patient Instructions    Return in about 6 months (around 8/25/2021) for Physical Exam, Medication follow up.    [facility]    Subjective   Collette is a 25 year old who presents via telephone call for the following health issues   Chief Complaint   Patient presents with     Depression     follow up     Referral     to OB/GYN for PCOS     HPI       Depression Followup    How are you doing with your depression since your last visit? Improved     Are you having other symptoms that might be associated " with depression? No    Have you had a significant life event?  No     Are you feeling anxious or having panic attacks?   Yes:  feels anxious with her self    Do you have any concerns with your use of alcohol or other drugs? No    Social History     Tobacco Use     Smoking status: Former Smoker     Smokeless tobacco: Never Used   Substance Use Topics     Alcohol use: Yes     Drug use: Never     PHQ 9/9/2019 11/18/2019 8/18/2020   PHQ-9 Total Score 5 8 1   Q9: Thoughts of better off dead/self-harm past 2 weeks Not at all Not at all Not at all     DANISH-7 SCORE 9/9/2019 11/18/2019   Total Score - 6 (mild anxiety)   Total Score 7 6     Last PHQ-9 2/25/2021   1.  Little interest or pleasure in doing things 1   2.  Feeling down, depressed, or hopeless 0   3.  Trouble falling or staying asleep, or sleeping too much 3   4.  Feeling tired or having little energy 1   5.  Poor appetite or overeating 0   6.  Feeling bad about yourself 0   7.  Trouble concentrating 1   8.  Moving slowly or restless 0   Q9: Thoughts of better off dead/self-harm past 2 weeks 0   PHQ-9 Total Score 6   Difficulty at work, home, or with people Somewhat difficult       Suicide Assessment Five-step Evaluation and Treatment (SAFE-T)      How many servings of fruits and vegetables do you eat daily?  4 or more    On average, how many sweetened beverages do you drink each day (Examples: soda, juice, sweet tea, etc.  Do NOT count diet or artificially sweetened beverages)?   1    How many days per week do you exercise enough to make your heart beat faster? 5    How many minutes a day do you exercise enough to make your heart beat faster? 30 - 60    How many days per week do you miss taking your medication? 0    Depression:  On citalopram 10mg daily and it is working well.   Her mood is good.  She feels like the dosage is good and she does not need a dose adjustment.  She is not in therapy and she does not feel like she needs therapy at this time.    PCOS:  Her  periods are irregular.  She is taking her Metformin as prescribed and her birth control pills.  She would like a referral to OB/GYN.      Review of Systems   Constitutional, HEENT, cardiovascular, pulmonary, GI, , musculoskeletal, neuro, skin, endocrine and psych systems are negative, except as otherwise noted.      Objective       Vitals:  No vitals were obtained today due to virtual visit.    Physical Exam   healthy, alert and no distress  PSYCH: Alert and oriented times 3; coherent speech, normal   rate and volume, able to articulate logical thoughts, able   to abstract reason, no tangential thoughts, no hallucinations   or delusions  Her affect is normal  RESP: No cough, no audible wheezing, able to talk in full sentences  Remainder of exam unable to be completed due to telephone visits    Diagnostics:  Reviewed in Epic.          Phone call duration: 13 minutes

## 2021-04-25 ENCOUNTER — HEALTH MAINTENANCE LETTER (OUTPATIENT)
Age: 26
End: 2021-04-25

## 2021-07-07 ASSESSMENT — ANXIETY QUESTIONNAIRES
5. BEING SO RESTLESS THAT IT IS HARD TO SIT STILL: NOT AT ALL
7. FEELING AFRAID AS IF SOMETHING AWFUL MIGHT HAPPEN: NOT AT ALL
2. NOT BEING ABLE TO STOP OR CONTROL WORRYING: SEVERAL DAYS
6. BECOMING EASILY ANNOYED OR IRRITABLE: SEVERAL DAYS
1. FEELING NERVOUS, ANXIOUS, OR ON EDGE: SEVERAL DAYS
4. TROUBLE RELAXING: NOT AT ALL
3. WORRYING TOO MUCH ABOUT DIFFERENT THINGS: NOT AT ALL
GAD7 TOTAL SCORE: 3

## 2021-07-14 ENCOUNTER — LAB (OUTPATIENT)
Dept: LAB | Facility: CLINIC | Age: 26
End: 2021-07-14
Attending: NURSE PRACTITIONER
Payer: COMMERCIAL

## 2021-07-14 ENCOUNTER — OFFICE VISIT (OUTPATIENT)
Dept: OBGYN | Facility: CLINIC | Age: 26
End: 2021-07-14
Attending: NURSE PRACTITIONER
Payer: COMMERCIAL

## 2021-07-14 VITALS
BODY MASS INDEX: 29.39 KG/M2 | HEIGHT: 63 IN | SYSTOLIC BLOOD PRESSURE: 119 MMHG | WEIGHT: 165.9 LBS | HEART RATE: 79 BPM | DIASTOLIC BLOOD PRESSURE: 76 MMHG

## 2021-07-14 DIAGNOSIS — E28.2 PCOS (POLYCYSTIC OVARIAN SYNDROME): ICD-10-CM

## 2021-07-14 DIAGNOSIS — N94.10 DYSPAREUNIA, FEMALE: Primary | ICD-10-CM

## 2021-07-14 LAB — HBA1C MFR BLD: 5 % (ref 0–5.6)

## 2021-07-14 PROCEDURE — 83036 HEMOGLOBIN GLYCOSYLATED A1C: CPT

## 2021-07-14 PROCEDURE — 36415 COLL VENOUS BLD VENIPUNCTURE: CPT

## 2021-07-14 PROCEDURE — G0463 HOSPITAL OUTPT CLINIC VISIT: HCPCS

## 2021-07-14 PROCEDURE — 84270 ASSAY OF SEX HORMONE GLOBUL: CPT

## 2021-07-14 PROCEDURE — 81003 URINALYSIS AUTO W/O SCOPE: CPT | Performed by: STUDENT IN AN ORGANIZED HEALTH CARE EDUCATION/TRAINING PROGRAM

## 2021-07-14 PROCEDURE — 99203 OFFICE O/P NEW LOW 30 MIN: CPT | Mod: GE | Performed by: OBSTETRICS & GYNECOLOGY

## 2021-07-14 ASSESSMENT — ENCOUNTER SYMPTOMS
MUSCLE WEAKNESS: 0
WEIGHT GAIN: 0
BACK PAIN: 1
POLYPHAGIA: 0
NERVOUS/ANXIOUS: 1
MYALGIAS: 0
DECREASED APPETITE: 0
SMELL DISTURBANCE: 0
NECK MASS: 0
SLEEP DISTURBANCES DUE TO BREATHING: 0
LIGHT-HEADEDNESS: 1
JOINT SWELLING: 0
INCREASED ENERGY: 1
PALPITATIONS: 1
SINUS CONGESTION: 0
NECK PAIN: 1
DECREASED LIBIDO: 1
HOARSE VOICE: 0
LEG PAIN: 0
POLYDIPSIA: 0
HOT FLASHES: 0
TASTE DISTURBANCE: 0
HYPERTENSION: 0
SORE THROAT: 0
HYPOTENSION: 0
INSOMNIA: 1
HALLUCINATIONS: 0
DEPRESSION: 1
NIGHT SWEATS: 0
ORTHOPNEA: 0
FATIGUE: 1
SINUS PAIN: 0
CHILLS: 0
STIFFNESS: 1
DECREASED CONCENTRATION: 0
ALTERED TEMPERATURE REGULATION: 0
ARTHRALGIAS: 0
SYNCOPE: 0
EXERCISE INTOLERANCE: 0
TROUBLE SWALLOWING: 0
MUSCLE CRAMPS: 0
WEIGHT LOSS: 0
FEVER: 0
PANIC: 0

## 2021-07-14 ASSESSMENT — ANXIETY QUESTIONNAIRES
1. FEELING NERVOUS, ANXIOUS, OR ON EDGE: SEVERAL DAYS
7. FEELING AFRAID AS IF SOMETHING AWFUL MIGHT HAPPEN: NOT AT ALL
7. FEELING AFRAID AS IF SOMETHING AWFUL MIGHT HAPPEN: NOT AT ALL
GAD7 TOTAL SCORE: 3
4. TROUBLE RELAXING: NOT AT ALL
6. BECOMING EASILY ANNOYED OR IRRITABLE: SEVERAL DAYS
7. FEELING AFRAID AS IF SOMETHING AWFUL MIGHT HAPPEN: NOT AT ALL
2. NOT BEING ABLE TO STOP OR CONTROL WORRYING: SEVERAL DAYS
5. BEING SO RESTLESS THAT IT IS HARD TO SIT STILL: NOT AT ALL
GAD7 TOTAL SCORE: 3
1. FEELING NERVOUS, ANXIOUS, OR ON EDGE: SEVERAL DAYS
GAD7 TOTAL SCORE: 3
6. BECOMING EASILY ANNOYED OR IRRITABLE: SEVERAL DAYS
3. WORRYING TOO MUCH ABOUT DIFFERENT THINGS: SEVERAL DAYS
8. IF YOU CHECKED OFF ANY PROBLEMS, HOW DIFFICULT HAVE THESE MADE IT FOR YOU TO DO YOUR WORK, TAKE CARE OF THINGS AT HOME, OR GET ALONG WITH OTHER PEOPLE?: SOMEWHAT DIFFICULT
GAD7 TOTAL SCORE: 4
3. WORRYING TOO MUCH ABOUT DIFFERENT THINGS: NOT AT ALL
2. NOT BEING ABLE TO STOP OR CONTROL WORRYING: SEVERAL DAYS
5. BEING SO RESTLESS THAT IT IS HARD TO SIT STILL: NOT AT ALL

## 2021-07-14 ASSESSMENT — PATIENT HEALTH QUESTIONNAIRE - PHQ9
5. POOR APPETITE OR OVEREATING: NOT AT ALL
SUM OF ALL RESPONSES TO PHQ QUESTIONS 1-9: 5

## 2021-07-14 ASSESSMENT — MIFFLIN-ST. JEOR: SCORE: 1452.68

## 2021-07-14 NOTE — LETTER
7/14/2021       RE: Jessica Scott  [address]  [facility] [address]     Dear Colleague,    Thank you for referring your patient, Jessica Scott, to the [facility] [place] [facility]. Please see a copy of my visit note below.    9/9/2019  PCOS - She is currently taking spironolactone and metformin. Normal menstrua cycles.   She endorses BM twice/week. Stools are hard.       Birth control  - drospirenone-ethinyl estradiol (SUN) 3-0.02 MG tablet; Take 1 tablet by mouth daily  Dispense: 84 tablet; Refill: 3    (E28.2) PCOS (polycystic ovarian syndrome)  Comment: Chronic/controlled  Plan: metFORMIN (GLUCOPHAGE) 500 MG tablet,         spironolactone (ALDACTONE) 50 MG tablet        I did go ahead and give her refills of her medication for 1 year.  She is to continue to take her medications as prescribed.  She did have labs drawn in June 2020, and everything was normal.  With her next clinic appointment in 1 year, she is to anticipate additional lab studies at that time.  She is to be rechecked sooner if any problems.    9/9/2019  A1c 5.4  Elevated lipids  Pap NILM  BMP wnl        SUBJECTIVE   Jessica Scott is a 25 year old G0 who presents for consultation for dyspareunia. Seen with her partner Da.     She has had dyspareunia for the last 4-5 years since she was 19yo. Pain is mostly with entry, feels like skin irritation, also has fissuring of skin at times that is very painful. She denies deep dyspareunia, or improved pain with positional changes. They do use Astroglide frequently which helps. Only thing that changed around that time was that she had a bad yeast infection which made things worse. She does have occasional yeast infections that she treats with Monistat, she has not used this for several months. She has occasional pain with urination after intercourse, but not otherwise. Denies other bowel/bladder concerns. Denies  vaginal discharge or irritation outside of yeast infections.     She also has low libido the last 5 years. Unsure if this is related to onset of pain with intercourse. Also has MDD and started citalopram around that time.    Diagnosed with PCOS several years ago by lab work, never had an ultrasound. She does get occasional acne, denies hirsuitism. One aunt with PCOS. She did have prediabetes and has been on metformin and spironolactone for PCOS, but had some weight loss and prediabetes resolved.       Gynecologic History  Patient's last menstrual period was 06/23/2021.   Menstrual History:  Menstrual History 8/18/2020 2/25/2021 7/14/2021   LAST MENSTRUAL PERIOD 7/24/2020 1/31/2021 6/23/2021   Periods are monthly, lasting 3 days and light  After menarche had heavy painful periods but these resolved in high school and college  Denies STIs  Had UTIs as a child but they stopped after she had a procedure that filled her bladder with saline  Current contraception: OCPs - on Sun  Number of partners in last year: 1    Lab Results   Component Value Date    PAP NIL 11/18/2019     History of abnormal Pap smear: NO    Obstetric History  OB History   No obstetric history on file.        Past Medical History  Past Medical History:   Diagnosis Date     Depressive disorder      PCOS (polycystic ovarian syndrome)        Past Surgical History  Past Surgical History:   Procedure Laterality Date     ENT SURGERY  2003     SOFT TISSUE SURGERY  2009/2010    removed cyst from tendon       Medications  Current Outpatient Medications   Medication     [START ON 7/15/2021] conjugated estrogens (PREMARIN) 0.625 MG/GM vaginal cream     citalopram (CELEXA) 10 MG tablet     drospirenone-ethinyl estradiol (SUN) 3-0.02 MG tablet     metFORMIN (GLUCOPHAGE) 500 MG tablet     spironolactone (ALDACTONE) 50 MG tablet     No current facility-administered medications for this visit.       Allergies     Allergies   Allergen Reactions     Ceclor [Cefaclor]   "    Hives        Social History   Social History     Tobacco Use     Smoking status: Former Smoker     Smokeless tobacco: Never Used   Substance Use Topics     Alcohol use: Yes     Comment: NOT everyday. Casual     Drug use: Never       Family History  Family History   Problem Relation Age of Onset     Melanoma Mother      Migraines Mother      Arthritis Mother      Depression Mother      Anxiety Disorder Mother      Depression Father      Arthritis Father      Hyperlipidemia Father      Diabetes Maternal Grandmother      Hypertension Maternal Grandmother      Depression Maternal Grandmother      Anxiety Disorder Maternal Grandmother      Thyroid Disease Maternal Grandmother      Hyperlipidemia Maternal Grandfather      Cerebrovascular Disease Maternal Grandfather      Depression Maternal Grandfather      Asthma Maternal Grandfather      Other Cancer Paternal Grandmother      Diabetes Paternal Grandfather      Thyroid Disease Paternal Grandfather      Depression Sister      Anxiety Disorder Sister      Depression Other        Review of Systems  See below    OBJECTIVE   /76   Pulse 79   Ht 1.594 m (5' 2.75\")   Wt 75.3 kg (165 lb 14.4 oz)   LMP 06/23/2021   BMI 29.62 kg/m    BMI: Body mass index is 29.62 kg/m .  General:  Alert, no distress       Lungs:  Normal work of breathing   Heart:  Well perfused   Abdomen:  Soft, non-tender, non-distended   Pelvic: Normal external genitalia. No rashes, skin irritation or erythema. Tenderness to palpation especially at posterior forchette, and with initial entry of speculum. Normal vaginal mucosa and cervix. Physiologic discharage. Uterus mid position, mobile, nontender. No adnexal masses palpated       Extremities:  normal     Labs:  Wet Prep: Neg  UA: Neg      ASSESSMENT   Jessica Scott is a 25 year old here for dyspareunia. She has normal exam findings, without evidence of vulvar dermatitis or yeast infection. On exam, most tender at vestibule, possible " vestibulitis though without skin changes at vestibule.     Differential diagnosis includes: vaginal/vulvar atrophy with long term OCPs, vaginismus, vulvar vestibulitis, pelvic floor weakness/spasticity, and non gynecologic causes.     Discussed treatment options, including vaginal estrogen, and pelvic floor physical therapy. She will consider PT, but will start vaginal estrogen and follow up in 1-2 months. If this is not helpful, consider Gabapentin or Amitryptiline topical treatments.     PLAN   (N94.10) Dyspareunia, female  (primary encounter diagnosis)  Plan: conjugated estrogens (PREMARIN) 0.625 MG/GM         vaginal cream            (E28.2) PCOS (polycystic ovarian syndrome)  Plan: US Pelvic Complete with Transvaginal, Hgb A1c,         Testosterone Free and Total      Results via MyChart.     RTC in 1-2 months for follow up of dyspareunia.     Discussed with Dr. Darwin Florentino MD PGY3  Obstetrics & Gynecology  07/14/21                   Answers for HPI/ROS submitted by the patient on 7/7/2021  DANISH 7 TOTAL SCORE: 3  General Symptoms: Yes  Skin Symptoms: No  HENT Symptoms: Yes  EYE SYMPTOMS: No  HEART SYMPTOMS: Yes  LUNG SYMPTOMS: No  INTESTINAL SYMPTOMS: No  URINARY SYMPTOMS: No  GYNECOLOGIC SYMPTOMS: Yes  BREAST SYMPTOMS: No  SKELETAL SYMPTOMS: Yes  BLOOD SYMPTOMS: No  NERVOUS SYSTEM SYMPTOMS: No  MENTAL HEALTH SYMPTOMS: Yes  Ear pain: Yes  Ear discharge: No  Hearing loss: No  Tinnitus: Yes  Nosebleeds: No  Congestion: No  Sinus pain: No  Trouble swallowing: No   Voice hoarseness: No  Mouth sores: No  Sore throat: No  Tooth pain: No  Gum tenderness: No  Bleeding gums: No  Change in taste: No  Change in sense of smell: No  Dry mouth: No  Hearing aid used: No  Neck lump: No  Fever: No  Loss of appetite: No  Weight loss: No  Weight gain: No  Fatigue: Yes  Night sweats: No  Chills: No  Increased stress: Yes  Excessive hunger: No  Excessive thirst: No  Feeling hot or cold when others believe the  temperature is normal: No  Loss of height: No  Post-operative complications: No  Surgical site pain: No  Hallucinations: No  Change in or Loss of Energy: Yes  Hyperactivity: No  Confusion: No  Chest pain or pressure: No  Fast or irregular heartbeat: Yes  Pain in legs with walking: No  Trouble breathing while lying down: No  Fingers or toes appear blue: No  High blood pressure: No  Low blood pressure: No  Fainting: No  Murmurs: No  Pacemaker: No  Varicose veins: No  Edema or swelling: No  Wake up at night with shortness of breath: No  Light-headedness: Yes  Exercise intolerance: No  Heavy or painful periods: No  Irregular periods: No  Vaginal discharge: Yes  Hot flashes: No  Vaginal dryness: No  Genital ulcers: No  Reduced libido: Yes  Painful intercourse: Yes  Difficulty with sexual arousal: Yes  Post-menopausal bleeding: No  Back pain: Yes  Muscle aches: No  Neck pain: Yes  Swollen joints: No  Joint pain: No  Bone pain: No  Muscle cramps: No  Muscle weakness: No  Joint stiffness: Yes  Bone fracture: No  Nervous or Anxious: Yes  Depression: Yes  Trouble sleeping: Yes  Trouble thinking or concentrating: No  Mood changes: No  Panic attacks: No    The Patient was seen in [facility] by Dr. Florentino.   I reviewed the history & was present for the exam. Assessment and plan were jointly made.   Allison Granados MD, MPH

## 2021-07-14 NOTE — PROGRESS NOTES
9/9/2019  PCOS - She is currently taking spironolactone and metformin. Normal menstrua cycles.   She endorses BM twice/week. Stools are hard.       Birth control  - drospirenone-ethinyl estradiol (SUN) 3-0.02 MG tablet; Take 1 tablet by mouth daily  Dispense: 84 tablet; Refill: 3    (E28.2) PCOS (polycystic ovarian syndrome)  Comment: Chronic/controlled  Plan: metFORMIN (GLUCOPHAGE) 500 MG tablet,         spironolactone (ALDACTONE) 50 MG tablet        I did go ahead and give her refills of her medication for 1 year.  She is to continue to take her medications as prescribed.  She did have labs drawn in June 2020, and everything was normal.  With her next clinic appointment in 1 year, she is to anticipate additional lab studies at that time.  She is to be rechecked sooner if any problems.    9/9/2019  A1c 5.4  Elevated lipids  Pap NILM  BMP wnl        SUBJECTIVE   Jessicaalicia Scott is a 25 year old G0 who presents for consultation for dyspareunia. Seen with her partner Da.     She has had dyspareunia for the last 4-5 years since she was 21yo. Pain is mostly with entry, feels like skin irritation, also has fissuring of skin at times that is very painful. She denies deep dyspareunia, or improved pain with positional changes. They do use Astroglide frequently which helps. Only thing that changed around that time was that she had a bad yeast infection which made things worse. She does have occasional yeast infections that she treats with Monistat, she has not used this for several months. She has occasional pain with urination after intercourse, but not otherwise. Denies other bowel/bladder concerns. Denies vaginal discharge or irritation outside of yeast infections.     She also has low libido the last 5 years. Unsure if this is related to onset of pain with intercourse. Also has MDD and started citalopram around that time.    Diagnosed with PCOS several years ago by lab work, never had an ultrasound. She does get  occasional acne, denies hirsuitism. One aunt with PCOS. She did have prediabetes and has been on metformin and spironolactone for PCOS, but had some weight loss and prediabetes resolved.       Gynecologic History  Patient's last menstrual period was 06/23/2021.   Menstrual History:  Menstrual History 8/18/2020 2/25/2021 7/14/2021   LAST MENSTRUAL PERIOD 7/24/2020 1/31/2021 6/23/2021   Periods are monthly, lasting 3 days and light  After menarche had heavy painful periods but these resolved in high school and college  Denies STIs  Had UTIs as a child but they stopped after she had a procedure that filled her bladder with saline  Current contraception: OCPs - on Sun  Number of partners in last year: 1    Lab Results   Component Value Date    PAP NIL 11/18/2019     History of abnormal Pap smear: NO    Obstetric History  OB History   No obstetric history on file.        Past Medical History  Past Medical History:   Diagnosis Date     Depressive disorder      PCOS (polycystic ovarian syndrome)        Past Surgical History  Past Surgical History:   Procedure Laterality Date     ENT SURGERY  2003     SOFT TISSUE SURGERY  2009/2010    removed cyst from tendon       Medications  Current Outpatient Medications   Medication     [START ON 7/15/2021] conjugated estrogens (PREMARIN) 0.625 MG/GM vaginal cream     citalopram (CELEXA) 10 MG tablet     drospirenone-ethinyl estradiol (SUN) 3-0.02 MG tablet     metFORMIN (GLUCOPHAGE) 500 MG tablet     spironolactone (ALDACTONE) 50 MG tablet     No current facility-administered medications for this visit.       Allergies     Allergies   Allergen Reactions     Ceclor [Cefaclor]      Hives        Social History   Social History     Tobacco Use     Smoking status: Former Smoker     Smokeless tobacco: Never Used   Substance Use Topics     Alcohol use: Yes     Comment: NOT everyday. Casual     Drug use: Never       Family History  Family History   Problem Relation Age of Onset     Melanoma  "Mother      Migraines Mother      Arthritis Mother      Depression Mother      Anxiety Disorder Mother      Depression Father      Arthritis Father      Hyperlipidemia Father      Diabetes Maternal Grandmother      Hypertension Maternal Grandmother      Depression Maternal Grandmother      Anxiety Disorder Maternal Grandmother      Thyroid Disease Maternal Grandmother      Hyperlipidemia Maternal Grandfather      Cerebrovascular Disease Maternal Grandfather      Depression Maternal Grandfather      Asthma Maternal Grandfather      Other Cancer Paternal Grandmother      Diabetes Paternal Grandfather      Thyroid Disease Paternal Grandfather      Depression Sister      Anxiety Disorder Sister      Depression Other        Review of Systems  See below    OBJECTIVE   /76   Pulse 79   Ht 1.594 m (5' 2.75\")   Wt 75.3 kg (165 lb 14.4 oz)   LMP 06/23/2021   BMI 29.62 kg/m    BMI: Body mass index is 29.62 kg/m .  General:  Alert, no distress       Lungs:  Normal work of breathing   Heart:  Well perfused   Abdomen:  Soft, non-tender, non-distended   Pelvic: Normal external genitalia. No rashes, skin irritation or erythema. Tenderness to palpation especially at posterior forchette, and with initial entry of speculum. Normal vaginal mucosa and cervix. Physiologic discharage. Uterus mid position, mobile, nontender. No adnexal masses palpated       Extremities:  normal     Labs:  Wet Prep: Neg  UA: Neg      ASSESSMENT   Jessica Scott is a 25 year old here for dyspareunia. She has normal exam findings, without evidence of vulvar dermatitis or yeast infection. On exam, most tender at vestibule, possible vestibulitis though without skin changes at vestibule.     Differential diagnosis includes: vaginal/vulvar atrophy with long term OCPs, vaginismus, vulvar vestibulitis, pelvic floor weakness/spasticity, and non gynecologic causes.     Discussed treatment options, including vaginal estrogen, and pelvic floor physical " therapy. She will consider PT, but will start vaginal estrogen and follow up in 1-2 months. If this is not helpful, consider Gabapentin or Amitryptiline topical treatments.     PLAN   (N94.10) Dyspareunia, female  (primary encounter diagnosis)  Plan: conjugated estrogens (PREMARIN) 0.625 MG/GM         vaginal cream            (E28.2) PCOS (polycystic ovarian syndrome)  Plan: US Pelvic Complete with Transvaginal, Hgb A1c,         Testosterone Free and Total      Results via MyChart.     RTC in 1-2 months for follow up of dyspareunia.     Discussed with Dr. Darwin Florentino MD PGY3  Obstetrics & Gynecology  07/14/21                   Answers for HPI/ROS submitted by the patient on 7/7/2021  DANISH 7 TOTAL SCORE: 3  General Symptoms: Yes  Skin Symptoms: No  HENT Symptoms: Yes  EYE SYMPTOMS: No  HEART SYMPTOMS: Yes  LUNG SYMPTOMS: No  INTESTINAL SYMPTOMS: No  URINARY SYMPTOMS: No  GYNECOLOGIC SYMPTOMS: Yes  BREAST SYMPTOMS: No  SKELETAL SYMPTOMS: Yes  BLOOD SYMPTOMS: No  NERVOUS SYSTEM SYMPTOMS: No  MENTAL HEALTH SYMPTOMS: Yes  Ear pain: Yes  Ear discharge: No  Hearing loss: No  Tinnitus: Yes  Nosebleeds: No  Congestion: No  Sinus pain: No  Trouble swallowing: No   Voice hoarseness: No  Mouth sores: No  Sore throat: No  Tooth pain: No  Gum tenderness: No  Bleeding gums: No  Change in taste: No  Change in sense of smell: No  Dry mouth: No  Hearing aid used: No  Neck lump: No  Fever: No  Loss of appetite: No  Weight loss: No  Weight gain: No  Fatigue: Yes  Night sweats: No  Chills: No  Increased stress: Yes  Excessive hunger: No  Excessive thirst: No  Feeling hot or cold when others believe the temperature is normal: No  Loss of height: No  Post-operative complications: No  Surgical site pain: No  Hallucinations: No  Change in or Loss of Energy: Yes  Hyperactivity: No  Confusion: No  Chest pain or pressure: No  Fast or irregular heartbeat: Yes  Pain in legs with walking: No  Trouble breathing while lying down:  No  Fingers or toes appear blue: No  High blood pressure: No  Low blood pressure: No  Fainting: No  Murmurs: No  Pacemaker: No  Varicose veins: No  Edema or swelling: No  Wake up at night with shortness of breath: No  Light-headedness: Yes  Exercise intolerance: No  Heavy or painful periods: No  Irregular periods: No  Vaginal discharge: Yes  Hot flashes: No  Vaginal dryness: No  Genital ulcers: No  Reduced libido: Yes  Painful intercourse: Yes  Difficulty with sexual arousal: Yes  Post-menopausal bleeding: No  Back pain: Yes  Muscle aches: No  Neck pain: Yes  Swollen joints: No  Joint pain: No  Bone pain: No  Muscle cramps: No  Muscle weakness: No  Joint stiffness: Yes  Bone fracture: No  Nervous or Anxious: Yes  Depression: Yes  Trouble sleeping: Yes  Trouble thinking or concentrating: No  Mood changes: No  Panic attacks: No    The Patient was seen in [facility] by Dr. Florentino.   I reviewed the history & was present for the exam. Assessment and plan were jointly made.   Allison Granados MD, MPH

## 2021-07-15 ENCOUNTER — TELEPHONE (OUTPATIENT)
Dept: LAB | Facility: CLINIC | Age: 26
End: 2021-07-15
Payer: COMMERCIAL

## 2021-07-15 NOTE — TELEPHONE ENCOUNTER
Prior Authorization Retail Medication Request    Medication/Dose: prior auth or medication change for premarin vaginal cream  ICD code (if different than what is on RX):    Previously Tried and Failed:   Rationale:     Insurance Name:  [facility] [employer]   Insurance ID:  [id number]  Premarin vaginal cream is not covered, pa or change medication      Pharmacy Information (if different than what is on RX)  Name:  [facility]  Phone:  [phone]

## 2021-07-16 NOTE — TELEPHONE ENCOUNTER
Central Prior Authorization Team   Phone: [phone]      PA Initiation    Medication: conjugated estrogens (PREMARIN) 0.625 MG/GM vaginal cream  Insurance Company: [facility] - Phone [phone] Fax [phone]  Pharmacy Filling the Rx: [facility] - [place], [place] - [address]  Filling Pharmacy Phone: [phone]  Filling Pharmacy Fax:    Start Date: 7/16/2021

## 2021-07-19 LAB
ALBUMIN UR-MCNC: NEGATIVE MG/DL
APPEARANCE UR: CLEAR
BILIRUB UR QL STRIP: NEGATIVE
COLOR UR AUTO: YELLOW
GLUCOSE UR STRIP-MCNC: NEGATIVE MG/DL
HGB UR QL STRIP: NEGATIVE
KETONES UR STRIP-MCNC: NEGATIVE MG/DL
LEUKOCYTE ESTERASE UR QL STRIP: NEGATIVE
NITRATE UR QL: NEGATIVE
PH UR STRIP: 7 [PH] (ref 5–8)
SHBG SERPL-SCNC: 194 NMOL/L (ref 30–135)
SP GR UR STRIP: 1.01 (ref 1–1.03)
TESTOST FREE SERPL-MCNC: 0.11 NG/DL
TESTOST SERPL-MCNC: 23 NG/DL (ref 8–60)
UROBILINOGEN UR STRIP-ACNC: 0.2 E.U./DL

## 2021-07-19 NOTE — TELEPHONE ENCOUNTER
PRIOR AUTHORIZATION DENIED    Medication: conjugated estrogens (PREMARIN) 0.625 MG/GM vaginal cream    Denial Date: 7/19/2021    Denial Rational:         Appeal Information:

## 2021-08-23 ENCOUNTER — TELEPHONE (OUTPATIENT)
Dept: OBGYN | Facility: CLINIC | Age: 26
End: 2021-08-23

## 2021-08-23 DIAGNOSIS — N94.10 DYSPAREUNIA, FEMALE: Primary | ICD-10-CM

## 2021-08-23 RX ORDER — ESTRADIOL 0.1 MG/G
CREAM VAGINAL
Qty: 42.5 G | Refills: 0 | Status: SHIPPED | OUTPATIENT
Start: 2021-08-23 | End: 2022-03-29

## 2021-08-23 NOTE — TELEPHONE ENCOUNTER
Called Jessica.  Per her insurance formulary, estradiol vaginal cream is covered tier 1.  New rx sent.

## 2021-08-23 NOTE — TELEPHONE ENCOUNTER
[facility] Call Center    Phone Message    May a detailed message be left on voicemail: no     Reason for Call: Medication Question or concern regarding medication   Prescription Clarification  Name of Medication:conjugated estrogens (PREMARIN) 0.625 MG/GM vaginal cream  Prescribing Provider: Dr. Sunita Florentino   Pharmacy: [facility] - [place], [place] - [address]   What on the order needs clarification? Never got this prescription due to insurance/ pharmacy issue.  Please sent over and call Jessica when she can get it refilled.        Action Taken: Message routed to:  [facility]     Travel Screening: Not Applicable

## 2021-09-14 ENCOUNTER — TELEPHONE (OUTPATIENT)
Dept: OBGYN | Facility: CLINIC | Age: 26
End: 2021-09-14

## 2021-09-14 NOTE — TELEPHONE ENCOUNTER
Called patient.  She started estrace cream about 3 weeks ago.  She noted some itchy bumps on her legs around that same time.  This remained pretty stable until this past week.  Her last dose of estrace cream was 9/8.  On 9/13, she noted that she had hives on her back.  She denies any vaginal irritation, has seen scant brown spotting today.    She denies any other new medications, or exposures to new products.    She did use antihistamine last night, which helped her itching.  She is wondering if she should continue this medication.    Given the timing of symptoms and lack of local reaction, I am not sure that this is directly related to her estrogen cream.    She is given instructions to continue antihistamines for symptom control, and ED instructions if face, throat symptoms or any difficulty breathing.    Will route to MD to advise further

## 2021-09-14 NOTE — TELEPHONE ENCOUNTER
----- Message from Marissa Ferguson RN sent at 9/14/2021  9:54 AM CDT -----  Regarding: pt started new medication and has hives - please call back

## 2021-09-14 NOTE — TELEPHONE ENCOUNTER
If it is the only thing that has changed since development of the hives then I would have her stop it and see if the symptoms resolve.  It may not be the estrogen itself but something the estrogen is mixed in for the cream that she is allergic to, I have had that happen with other patients, but would have expected her to have more localized symptoms at the vagina too.  I would try a trial of discontinuation and go from there!  Thanks, Dr. Granados

## 2021-10-08 ENCOUNTER — NURSE TRIAGE (OUTPATIENT)
Dept: NURSING | Facility: CLINIC | Age: 26
End: 2021-10-08

## 2021-10-08 ENCOUNTER — OFFICE VISIT (OUTPATIENT)
Dept: INTERNAL MEDICINE | Facility: CLINIC | Age: 26
End: 2021-10-08
Payer: COMMERCIAL

## 2021-10-08 VITALS
SYSTOLIC BLOOD PRESSURE: 112 MMHG | BODY MASS INDEX: 29.82 KG/M2 | DIASTOLIC BLOOD PRESSURE: 74 MMHG | TEMPERATURE: 99 F | WEIGHT: 167 LBS | OXYGEN SATURATION: 99 % | HEART RATE: 79 BPM

## 2021-10-08 DIAGNOSIS — J30.2 SEASONAL ALLERGIC RHINITIS, UNSPECIFIED TRIGGER: ICD-10-CM

## 2021-10-08 DIAGNOSIS — L50.9 URTICARIA: Primary | ICD-10-CM

## 2021-10-08 PROCEDURE — 99213 OFFICE O/P EST LOW 20 MIN: CPT | Performed by: INTERNAL MEDICINE

## 2021-10-08 RX ORDER — PREDNISONE 10 MG/1
TABLET ORAL
Qty: 30 TABLET | Refills: 0 | Status: SHIPPED | OUTPATIENT
Start: 2021-10-08 | End: 2021-10-20

## 2021-10-08 NOTE — TELEPHONE ENCOUNTER
"Patient calling reporting intermittent episodes of hives starting 1 month ago intermittently.  Patient reporting hives are widespread today increasing in past 24 hours. Reporting raised itchy, blotchy rash from face, chest, back, arms, and legs.  Afebrile.   \"They itch terribly.\" Applying hydrocortisone along with oral allergy medication     Disposition to see today in office.    Patient was transferred to Central Scheduling. Reviewed Walk In  option.    Allison Everett RN  [facility]      COVID 19 Nurse Triage Plan/Patient Instructions    Please be aware that novel coronavirus (COVID-19) may be circulating in the community. If you develop symptoms such as fever, cough, or SOB or if you have concerns about the presence of another infection including coronavirus (COVID-19), please contact your health care provider or visit [email or web address].     Disposition/Instructions    In-Person Visit with provider recommended. Reference Visit Selection Guide.    Thank you for taking steps to prevent the spread of this virus.  o Limit your contact with others.  o Wear a simple mask to cover your cough.  o Wash your hands well and often.    Resources    [facility]: About COVID-19: [email or web address]    CDC: What to Do If You're Sick: www.cdc.gov/coronavirus/2019-ncov/about/steps-when-sick.html    CDC: Ending Home Isolation: www.cdc.gov/coronavirus/2019-ncov/hcp/disposition-in-home-patients.html     CDC: Caring for Someone: www.cdc.gov/coronavirus/2019-ncov/if-you-are-sick/care-for-someone.html     [facility]: Interim Guidance for Hospital Discharge to Home: www.health.[facility].mn.us/diseases/coronavirus/hcp/hospdischarge.pdf    [facility] clinical trials (COVID-19 research studies): [email or web address]     Below are the COVID-19 hotlines at the [facility] ([facility]). Interpreters are available.   o For health questions: Call 340-577-9904 or " 1-441.621.7719 (7 a.m. to 7 p.m.)  o For questions about schools and childcare: Call 332-635-9138 or 1-117.390.4981 (7 a.m. to 7 p.m.)                            Reason for Disposition    MODERATE-SEVERE hives persist (i.e., hives interfere with normal activities or work) and taking antihistamine (e.g., Benadryl, Claritin) > 24 hours    Additional Information    Negative: Difficulty breathing or wheezing now    Negative: Rapid onset of swollen tongue    Negative: Rapid onset of hoarseness or cough    Negative: Very weak (can't stand)    Negative: Difficult to awaken or acting confused (e.g., disoriented, slurred speech)    Negative: Life-threatening reaction (anaphylaxis) in the past to similar substance (e.g., food, insect bite/sting, chemical, etc.) and < 2 hours since exposure    Negative: Sounds like a life-threatening emergency to the triager    Negative: Bee, wasp, or yellow jacket sting within last 24 hours    Negative: Taking a new medicine now or within last 3 days (Exception: antihistamine, decongestant or other OTC cough/cold medicines)    Negative: Doesn't match the SYMPTOMS of hives    Negative: Swollen tongue    Negative: Widespread hives, itching, or facial swelling and onset < 2 hours of exposure to high-risk allergen (e.g., 1st dose of antibiotic, nuts, sting)    Negative: Patient sounds very sick or weak to the triager    Protocols used: HIVES-A-OH

## 2021-10-08 NOTE — PATIENT INSTRUCTIONS
"*  Urticaria or \"hives\" is a systemic allergic reaction that needs to be \"treated from the inside\".  Avoid any known triggers (if you can find out what they might have been.  Sometime you never find out what triggered the reaction.      *  Oral Steroids to reduce the active inflammation and irritation:     --Prednisone 40 mg per day for 3 days, then 30 mg per day for 3 days, then 20 mg per day for 3 days, then 10 mg per day for 3 days, then 5 mg ( 1/2 of 10 mg tablet) per day for 3 days then stop.      *  Take over the counter allergy tablet 2 tablets every day for the next 4-6 weeks.    Take either generic Allegra (fexofenadine), generic Zyrtec (Cetirizine), or generic Claritin (Loratadine) once per day for the next 3-4 weeks.  Sometimes the lingering allergy immune complexes can linger in the body for 2-4 weeks after the exposure.  Insurances do not cover over the counter medications.     *  Use over the counter Benadryl 25 or 50 mg twice per day as needed for itching relief,perhaps more in the evening or bedtime.  Beware of drowsiness after taking this medication, do not drive, use dangerous machinery, or perform dangerous tasks after taking this.      *  Try to see if there is anything new in your life such as new medication, no soaps, new or different foods, new pets, that might be playing a role in your symptoms.  Many times there is no obvious cause identified for this.     *  If the symptoms persist, then you may need to see an allergist or Dermatologist for further workup.       *  Let us know if the symptoms continue.          SEASONAL ALLERGIES:     *  Take one of the following over the counter non-sedating anti-histamines allergy tablet once per day, every day for the next 4-8 weeks during the duration of the allergy season.      --generic Allegra (fexofenidine)  OR  --generic Claritin (loratidine)     OR  --generic Zyrtec (cetirizine)      *  IF YOU HAVE A LOT OF EYE IRRITATION FROM ALLERGIES:Use " "allergy eye drops IF NEEDED for allergic conjunctivitis     --Patanol (or similar over the counter product):  1 drop into the affected eye twice per day as needed during the main allergy season(s)     --if this prescription eye drop is not covered by your insurance, then have the pharmacist direct you to a similar over the counter version.          *  IF YOU HAVE A LOT OF NASAL OR SINUS CONGESTION:  Use steroid nasal spray (available over the counter), Use 2 sprays into each nostril once per day, every day regardless of how you feel for the next 4-8 weeks, depending on the length of the allergy season.  This type of steroid nasal spray will take at least 10 days to reach full effect, please use it for at least 3 full weeks before deciding if it doesn't work for you.       --typical brands include Flonase (fluticasone) or Nasonex (mometasone) or Nasocort (triamcinolone)    Examples of steroid nasal spray:  (cheapest prices are from [employer] or [place] [employer])             * Beware of decongestants or medications preparations that have a \"D\", these contain pseudoephedrine or phenylephrine which may raise your blood pressure. If your blood pressure is well controlled and you are not on multiple medications, then you may be able to take small amounts of decongestant without a large chance of side effects, but please monitor your blood pressure and if >140/90 while on the decongestants, then stop those products.     *  If you cannot tolerate decongestants or have been told not to take decongestants, you can use chlortrimeton (chlorpheniramine) if you react poorly to decongestants.  Always try and use the lowest dose needed.  If you have a low of overnight or early morning allergy symptoms, then try taking you favorite nighttime multi symptom cold reliever medication (such as Nyquil, Vicks Formula 44, etc.)          5 GOALS TO PREVENT VASCULAR DISEASE:     1.  Aggressive blood pressure control, under 130/80 ideally.  Using " "medications if needed.    Your blood pressure is under good control    BP Readings from Last 4 Encounters:   10/08/21 112/74   07/14/21 119/76   11/24/20 110/74   11/18/19 102/58       2.  Aggressive LDL cholesterol (\"bad cholesterol\") lowering as indicated.    Your goal is an LDL under 130 for sure, preferably under 100.  (If you have diabetes or previous vascular disease, the the LDL goals would be under 100 for sure, preferably under 70.)    New guidelines identify four high-risk groups who could benefit from statins:   *people with pre-existing heart disease, such as those who have had a heart attack;   *people ages 40 to 75 who have diabetes of any type  *patients ages 40 to 75 with at least a 7.5% risk of developing cardiovascular disease over the next decade, according to a formula described in the guidelines  *patients with the sort of super-high cholesterol that sometimes runs in families, as evidenced by an LDL of 190 milligrams per deciliter or higher    Your cholesterol levels are well controlled.    Recent Labs   Lab Test 09/09/19  1558   CHOL 207*   HDL 52   *   TRIG 222*       3.  Aggressive diabetic prevention, screening and/or management.      You do not have diabetes as of the most recent blood tests.     4.  No smoking    5.  Consider daily preventative aspirin over age 50 if you have enough cardiac risk factors to place you at higher risk for the presence of vascular disease.    If you have any reason not to take aspirin such easy bruising or bleeding, stomach problems, other anticoagulant medications, or any other side effects, then you should not take Aspirin.     --Based on your current lower cardiac risk profile or age, you do NOT need to take daily preventative aspirin          "

## 2021-10-08 NOTE — PROGRESS NOTES
Assessment & Plan     (L50.9) Urticaria  (primary encounter diagnosis)  Comment: Urticaria from unknown source, a significant contributor may be the current seasonal allergies, but is not likely the only cause as she has never had this before associated with seasonal allergies..   No evidence for respiratory compromise.    The symptoms are bad enough to warrant oral steroids.   Tapering course of prednisone over the next 12 days.  Recommended the patient take over the counter non-sedating antihistamines for the next 4-6 weeks.    Will have the patient continue to check to see if there are any new features in their life that may have caused this and eliminate any items that might have caused this (being sure to let us know before they stop any medications).   Recommended that the patient try over the counter benadryl 25 mg 2-3 times per day as needed for the itching, reminding them about the possibility of drowsiness from this medication.    If the symptoms continue or fail to resolve, will have them see Allergist or Dermatologist for further workup.     Plan: predniSONE (DELTASONE) 10 MG tablet            (J30.2) Seasonal allergic rhinitis, unspecified trigger  Comment: Reviewed seasonal allergies/ environmental allergies/allergic and/or chronic rhintis with the pt. and available treatment options.  Pt understands that the insurance companies have lately desiring to see trial and failure of claritin OTC before covering prescription.  Also disucssed the role of steroid nasal sprays in these types of situations. Discussed the concept of avoidance measures and taking an active role in modifying whatever can be changed in preventing exposures to knwon allergens.  Also discussed the imprtance of reconsdiering ets if they are part of the problem. Also discussed the absolute need for smoking cessation if any tobacco abuse present.   Plan:                 BMI:   Estimated body mass index is 29.82 kg/m  as calculated  "from the following:    Height as of 7/14/21: 1.594 m (5' 2.75\").    Weight as of this encounter: 75.8 kg (167 lb).           Return if symptoms worsen or fail to improve.    Jason Gant MD  [facility] EBER Murdock is a 26 year old who presents for the following health issues     HPI     Concern - hives  Onset: mid September  Description: all over body, red, raised blotches,severe itch  Intensity: severe itching  Progression of Symptoms:  worsening and constant  Accompanying Signs & Symptoms: none  Previous history of similar problem: yes-as a child when given Ceclor-on allergy list  Precipitating factors:        Worsened by: estradiol vaginal cream  Alleviating factors:        Improved by: none  Therapies tried and outcome: hydrocortisone, calamine lotion, baking soda bath, benadryl    Mild macular papular rash on body, neck for last couple weeks.  No new medications, no new foods, no new soaps/shampoos/body products, no specific triggers.   Mild itching to the rash.    No fevers, no joints pains, no chills.    Typically has seasonal allergy symptoms in the spring and fall.  Currently with ragweed having postnasal drip clear rhinorrhea.  Taking daily Claritin as it is.    **I reviewed the information recorded in the patient's EPIC chart (including but not limited to medical history, surgical history, family history, problem list, medication list, and allergy list) and updated the information as indicated based on the patients reported information.         Review of Systems   Constitutional, HEENT, cardiovascular, pulmonary, gi and gu systems are negative, except as otherwise noted.      Objective    /74   Pulse 79   Temp 99  F (37.2  C) (Tympanic)   Wt 75.8 kg (167 lb)   LMP 09/17/2021 (Approximate)   SpO2 99%   Breastfeeding No   BMI 29.82 kg/m    Body mass index is 29.82 kg/m .  Physical Exam   GENERAL alert and no distress  EYES:  Normal " sclera,conjunctiva, EOMI  HENT: facies symmetric  MS: extremities- no gross deformities of the visible extremities noted,   PSYCH: Alert and oriented times 3; speech- coherent  SKIN:  No obvious significant skin lesions on visible portions of face   NEURO:  Normal facial movements.  Appears to move normally   SKIN: Scattered small erythematous patchy wheals consistent with urticaria, very itchy

## 2021-10-10 ENCOUNTER — HEALTH MAINTENANCE LETTER (OUTPATIENT)
Age: 26
End: 2021-10-10

## 2021-10-12 ENCOUNTER — VIRTUAL VISIT (OUTPATIENT)
Dept: FAMILY MEDICINE | Facility: CLINIC | Age: 26
End: 2021-10-12
Payer: COMMERCIAL

## 2021-10-12 DIAGNOSIS — E28.2 PCOS (POLYCYSTIC OVARIAN SYNDROME): ICD-10-CM

## 2021-10-12 DIAGNOSIS — F33.9 EPISODE OF RECURRENT MAJOR DEPRESSIVE DISORDER, UNSPECIFIED DEPRESSION EPISODE SEVERITY (H): ICD-10-CM

## 2021-10-12 PROCEDURE — 96127 BRIEF EMOTIONAL/BEHAV ASSMT: CPT | Performed by: FAMILY MEDICINE

## 2021-10-12 PROCEDURE — 99214 OFFICE O/P EST MOD 30 MIN: CPT | Mod: 95 | Performed by: FAMILY MEDICINE

## 2021-10-12 RX ORDER — CITALOPRAM HYDROBROMIDE 10 MG/1
10 TABLET ORAL DAILY
Qty: 30 TABLET | Refills: 5 | Status: SHIPPED | OUTPATIENT
Start: 2021-10-24 | End: 2022-03-29

## 2021-10-12 RX ORDER — DROSPIRENONE AND ETHINYL ESTRADIOL 0.02-3(28)
1 KIT ORAL DAILY
Qty: 84 TABLET | Refills: 3 | Status: SHIPPED | OUTPATIENT
Start: 2021-10-12 | End: 2022-07-11

## 2021-10-12 RX ORDER — SPIRONOLACTONE 50 MG/1
50 TABLET, FILM COATED ORAL DAILY
Qty: 30 TABLET | Refills: 5 | Status: SHIPPED | OUTPATIENT
Start: 2021-10-24 | End: 2022-03-29

## 2021-10-12 ASSESSMENT — PATIENT HEALTH QUESTIONNAIRE - PHQ9
5. POOR APPETITE OR OVEREATING: NOT AT ALL
SUM OF ALL RESPONSES TO PHQ QUESTIONS 1-9: 2

## 2021-10-12 ASSESSMENT — ANXIETY QUESTIONNAIRES
5. BEING SO RESTLESS THAT IT IS HARD TO SIT STILL: NOT AT ALL
GAD7 TOTAL SCORE: 2
1. FEELING NERVOUS, ANXIOUS, OR ON EDGE: SEVERAL DAYS
IF YOU CHECKED OFF ANY PROBLEMS ON THIS QUESTIONNAIRE, HOW DIFFICULT HAVE THESE PROBLEMS MADE IT FOR YOU TO DO YOUR WORK, TAKE CARE OF THINGS AT HOME, OR GET ALONG WITH OTHER PEOPLE: NOT DIFFICULT AT ALL
2. NOT BEING ABLE TO STOP OR CONTROL WORRYING: NOT AT ALL
3. WORRYING TOO MUCH ABOUT DIFFERENT THINGS: NOT AT ALL
6. BECOMING EASILY ANNOYED OR IRRITABLE: SEVERAL DAYS
7. FEELING AFRAID AS IF SOMETHING AWFUL MIGHT HAPPEN: NOT AT ALL

## 2021-10-12 NOTE — PROGRESS NOTES
"Collette is a 26 year old who is being evaluated via a billable telephone visit.      What phone number would you like to be contacted at? [phone]  How would you like to obtain your AVS? MyChart    Assessment & Plan     Episode of recurrent major depressive disorder, unspecified depression episode severity (H)  Patient is tolerating current medication without any major side effects of concerns and current dose seems reasonable too.  Current medication regime is effective. Continue current treatment without any changes.   - citalopram (CELEXA) 10 MG tablet; Take 1 tablet (10 mg) by mouth daily    PCOS (polycystic ovarian syndrome)  Reviewed OB/GYN notes.  As per notes they documented that they will refill her prescription for a year but I do not see any refill.  I will refill it for her.  - drospirenone-ethinyl estradiol (LORYNA) 3-0.02 MG tablet; Take 1 tablet by mouth daily  - metFORMIN (GLUCOPHAGE) 500 MG tablet; Take 1 tablet (500 mg) by mouth daily (with breakfast)  - spironolactone (ALDACTONE) 50 MG tablet; Take 1 tablet (50 mg) by mouth daily             BMI:   Estimated body mass index is 29.82 kg/m  as calculated from the following:    Height as of 7/14/21: 1.594 m (5' 2.75\").    Weight as of 10/8/21: 75.8 kg (167 lb).           No follow-ups on file.    Chuy Cintron MD, MD  [facility]    Donnell Murdock is a 26 year old who presents for the following health issues     HPI     Depression and Anxiety Follow-Up    How are you doing with your depression since your last visit? Improved     How are you doing with your anxiety since your last visit?  Improved     Are you having other symptoms that might be associated with depression or anxiety? No    Have you had a significant life event? No     Do you have any concerns with your use of alcohol or other drugs? No    Social History     Tobacco Use     Smoking status: Former Smoker     Smokeless tobacco: Never " Used   Vaping Use     Vaping Use: Never used   Substance Use Topics     Alcohol use: Yes     Comment: 1 drink a week      Drug use: Never     PHQ 8/18/2020 2/25/2021 7/14/2021   PHQ-9 Total Score 1 6 5   Q9: Thoughts of better off dead/self-harm past 2 weeks Not at all Not at all Not at all     DANISH-7 SCORE 7/7/2021 7/14/2021 7/14/2021   Total Score - - 3 (minimal anxiety)   Total Score 3 3 4     Last PHQ-9 10/12/2021   1.  Little interest or pleasure in doing things 0   2.  Feeling down, depressed, or hopeless 0   3.  Trouble falling or staying asleep, or sleeping too much 1   4.  Feeling tired or having little energy 1   5.  Poor appetite or overeating 0   6.  Feeling bad about yourself 0   7.  Trouble concentrating 0   8.  Moving slowly or restless 0   Q9: Thoughts of better off dead/self-harm past 2 weeks 0   PHQ-9 Total Score 2   Difficulty at work, home, or with people Not difficult at all     DANISH-7  10/12/2021   1. Feeling nervous, anxious, or on edge 1   2. Not being able to stop or control worrying 0   3. Worrying too much about different things 0   4. Trouble relaxing 0   5. Being so restless that it is hard to sit still 0   6. Becoming easily annoyed or irritable 1   7. Feeling afraid, as if something awful might happen 0   DANISH-7 Total Score 2   If you checked any problems, how difficult have they made it for you to do your work, take care of things at home, or get along with other people? Not difficult at all     Suicide Assessment Five-step Evaluation and Treatment (SAFE-T)      Medication Followup of loryna, metformin, and spironolactone    Taking Medication as prescribed: yes    Side Effects:  Breast tenderness, bloating, and weight fluctuation     Medication Helping Symptoms:  yes     Running low on prescription.     Been to obgyn - estradiol cream - developed hives. Not sure if it is related. Using prednisone for that.   Probiotics are helping - does not feel she needs to use estrogen cream.  "    celexa - once per day and doing well. That dose is working well. Not too suppressive. No major side effects with it.     Birth control pill - needs refill on those too.     Review of Systems   Constitutional, HEENT, cardiovascular, pulmonary, gi and gu systems are negative, except as otherwise noted.      Objective    Vitals - Patient Reported  Weight (Patient Reported): 74.8 kg (165 lb)  Height (Patient Reported): 160 cm (5' 3\")  BMI (Based on Pt Reported Ht/Wt): 29.23        Physical Exam   healthy, alert and no distress  PSYCH: Alert and oriented times 3; coherent speech, normal   rate and volume, able to articulate logical thoughts, able   to abstract reason, no tangential thoughts, no hallucinations   or delusions  Her affect is normal  RESP: No cough, no audible wheezing, able to talk in full sentences  Remainder of exam unable to be completed due to telephone visits                Phone call duration: 12 minutes    "

## 2021-10-13 ASSESSMENT — ANXIETY QUESTIONNAIRES: GAD7 TOTAL SCORE: 2

## 2022-03-23 ASSESSMENT — ENCOUNTER SYMPTOMS
HEMATOCHEZIA: 0
HEADACHES: 0
EYE PAIN: 0
MYALGIAS: 0
DIZZINESS: 0
FEVER: 0
JOINT SWELLING: 0
HEMATURIA: 0
DIARRHEA: 0
WEAKNESS: 0
COUGH: 0
ARTHRALGIAS: 0
SORE THROAT: 0
SHORTNESS OF BREATH: 0
CHILLS: 0
PARESTHESIAS: 0
NERVOUS/ANXIOUS: 0
HEARTBURN: 0
ABDOMINAL PAIN: 0
DYSURIA: 0
NAUSEA: 0
FREQUENCY: 0
BREAST MASS: 0
CONSTIPATION: 0
PALPITATIONS: 0

## 2022-03-29 ENCOUNTER — OFFICE VISIT (OUTPATIENT)
Dept: FAMILY MEDICINE | Facility: CLINIC | Age: 27
End: 2022-03-29
Payer: COMMERCIAL

## 2022-03-29 VITALS
WEIGHT: 171 LBS | OXYGEN SATURATION: 98 % | TEMPERATURE: 97.9 F | BODY MASS INDEX: 30.3 KG/M2 | HEART RATE: 63 BPM | DIASTOLIC BLOOD PRESSURE: 60 MMHG | HEIGHT: 63 IN | SYSTOLIC BLOOD PRESSURE: 122 MMHG | RESPIRATION RATE: 16 BRPM

## 2022-03-29 DIAGNOSIS — Z00.00 ROUTINE GENERAL MEDICAL EXAMINATION AT A HEALTH CARE FACILITY: Primary | ICD-10-CM

## 2022-03-29 DIAGNOSIS — Z13.220 LIPID SCREENING: ICD-10-CM

## 2022-03-29 DIAGNOSIS — G47.00 INSOMNIA, UNSPECIFIED TYPE: ICD-10-CM

## 2022-03-29 DIAGNOSIS — F33.9 EPISODE OF RECURRENT MAJOR DEPRESSIVE DISORDER, UNSPECIFIED DEPRESSION EPISODE SEVERITY (H): ICD-10-CM

## 2022-03-29 DIAGNOSIS — E28.2 PCOS (POLYCYSTIC OVARIAN SYNDROME): ICD-10-CM

## 2022-03-29 DIAGNOSIS — Z11.59 NEED FOR HEPATITIS C SCREENING TEST: ICD-10-CM

## 2022-03-29 LAB
ALBUMIN SERPL-MCNC: 3.7 G/DL (ref 3.4–5)
ALP SERPL-CCNC: 48 U/L (ref 40–150)
ALT SERPL W P-5'-P-CCNC: 36 U/L (ref 0–50)
ANION GAP SERPL CALCULATED.3IONS-SCNC: 9 MMOL/L (ref 3–14)
AST SERPL W P-5'-P-CCNC: 27 U/L (ref 0–45)
BILIRUB SERPL-MCNC: 0.3 MG/DL (ref 0.2–1.3)
BUN SERPL-MCNC: 9 MG/DL (ref 7–30)
CALCIUM SERPL-MCNC: 9.7 MG/DL (ref 8.5–10.1)
CHLORIDE BLD-SCNC: 106 MMOL/L (ref 94–109)
CHOLEST SERPL-MCNC: 205 MG/DL
CO2 SERPL-SCNC: 23 MMOL/L (ref 20–32)
CREAT SERPL-MCNC: 0.9 MG/DL (ref 0.52–1.04)
DEPRECATED CALCIDIOL+CALCIFEROL SERPL-MC: 45 UG/L (ref 20–75)
ERYTHROCYTE [DISTWIDTH] IN BLOOD BY AUTOMATED COUNT: 12 % (ref 10–15)
FASTING STATUS PATIENT QL REPORTED: YES
GFR SERPL CREATININE-BSD FRML MDRD: 89 ML/MIN/1.73M2
GLUCOSE BLD-MCNC: 92 MG/DL (ref 70–99)
HBA1C MFR BLD: 5 % (ref 0–5.6)
HCT VFR BLD AUTO: 39.7 % (ref 35–47)
HCV AB SERPL QL IA: NONREACTIVE
HDLC SERPL-MCNC: 53 MG/DL
HGB BLD-MCNC: 13.4 G/DL (ref 11.7–15.7)
LDLC SERPL CALC-MCNC: 89 MG/DL
MCH RBC QN AUTO: 29.1 PG (ref 26.5–33)
MCHC RBC AUTO-ENTMCNC: 33.8 G/DL (ref 31.5–36.5)
MCV RBC AUTO: 86 FL (ref 78–100)
NONHDLC SERPL-MCNC: 152 MG/DL
PLATELET # BLD AUTO: 364 10E3/UL (ref 150–450)
POTASSIUM BLD-SCNC: 4.1 MMOL/L (ref 3.4–5.3)
PROT SERPL-MCNC: 7.5 G/DL (ref 6.8–8.8)
RBC # BLD AUTO: 4.61 10E6/UL (ref 3.8–5.2)
SODIUM SERPL-SCNC: 138 MMOL/L (ref 133–144)
TRIGL SERPL-MCNC: 315 MG/DL
TSH SERPL DL<=0.005 MIU/L-ACNC: 2.74 MU/L (ref 0.4–4)
WBC # BLD AUTO: 5.2 10E3/UL (ref 4–11)

## 2022-03-29 PROCEDURE — 99395 PREV VISIT EST AGE 18-39: CPT | Performed by: FAMILY MEDICINE

## 2022-03-29 PROCEDURE — 83036 HEMOGLOBIN GLYCOSYLATED A1C: CPT | Performed by: FAMILY MEDICINE

## 2022-03-29 PROCEDURE — 99214 OFFICE O/P EST MOD 30 MIN: CPT | Mod: 25 | Performed by: FAMILY MEDICINE

## 2022-03-29 PROCEDURE — 80061 LIPID PANEL: CPT | Performed by: FAMILY MEDICINE

## 2022-03-29 PROCEDURE — 82306 VITAMIN D 25 HYDROXY: CPT | Performed by: FAMILY MEDICINE

## 2022-03-29 PROCEDURE — 80053 COMPREHEN METABOLIC PANEL: CPT | Performed by: FAMILY MEDICINE

## 2022-03-29 PROCEDURE — 96127 BRIEF EMOTIONAL/BEHAV ASSMT: CPT | Performed by: FAMILY MEDICINE

## 2022-03-29 PROCEDURE — 36415 COLL VENOUS BLD VENIPUNCTURE: CPT | Performed by: FAMILY MEDICINE

## 2022-03-29 PROCEDURE — 84443 ASSAY THYROID STIM HORMONE: CPT | Performed by: FAMILY MEDICINE

## 2022-03-29 PROCEDURE — 86803 HEPATITIS C AB TEST: CPT | Performed by: FAMILY MEDICINE

## 2022-03-29 PROCEDURE — 85027 COMPLETE CBC AUTOMATED: CPT | Performed by: FAMILY MEDICINE

## 2022-03-29 RX ORDER — CITALOPRAM HYDROBROMIDE 10 MG/1
10 TABLET ORAL DAILY
Qty: 90 TABLET | Refills: 3 | Status: SHIPPED | OUTPATIENT
Start: 2022-03-29 | End: 2023-01-02

## 2022-03-29 ASSESSMENT — ENCOUNTER SYMPTOMS
NERVOUS/ANXIOUS: 0
DYSURIA: 0
PALPITATIONS: 0
SORE THROAT: 0
HEMATOCHEZIA: 0
PARESTHESIAS: 0
NAUSEA: 0
CHILLS: 0
HEMATURIA: 0
ARTHRALGIAS: 0
HEADACHES: 0
EYE PAIN: 0
ABDOMINAL PAIN: 0
HEARTBURN: 0
SHORTNESS OF BREATH: 0
MYALGIAS: 0
JOINT SWELLING: 0
COUGH: 0
FEVER: 0
DIARRHEA: 0
DIZZINESS: 0
BREAST MASS: 0
WEAKNESS: 0
FREQUENCY: 0
CONSTIPATION: 0

## 2022-03-29 ASSESSMENT — ANXIETY QUESTIONNAIRES
2. NOT BEING ABLE TO STOP OR CONTROL WORRYING: NOT AT ALL
6. BECOMING EASILY ANNOYED OR IRRITABLE: SEVERAL DAYS
3. WORRYING TOO MUCH ABOUT DIFFERENT THINGS: NOT AT ALL
5. BEING SO RESTLESS THAT IT IS HARD TO SIT STILL: NOT AT ALL
IF YOU CHECKED OFF ANY PROBLEMS ON THIS QUESTIONNAIRE, HOW DIFFICULT HAVE THESE PROBLEMS MADE IT FOR YOU TO DO YOUR WORK, TAKE CARE OF THINGS AT HOME, OR GET ALONG WITH OTHER PEOPLE: SOMEWHAT DIFFICULT
1. FEELING NERVOUS, ANXIOUS, OR ON EDGE: SEVERAL DAYS
GAD7 TOTAL SCORE: 2
7. FEELING AFRAID AS IF SOMETHING AWFUL MIGHT HAPPEN: NOT AT ALL

## 2022-03-29 ASSESSMENT — PATIENT HEALTH QUESTIONNAIRE - PHQ9
5. POOR APPETITE OR OVEREATING: NOT AT ALL
SUM OF ALL RESPONSES TO PHQ QUESTIONS 1-9: 4

## 2022-03-29 NOTE — PATIENT INSTRUCTIONS
Sleep - Melatonin up to 10 mg, 30 minutes before bedtime. Follow if symptoms worsen or fail to improve.          Preventive Health Recommendations  Female Ages 26 - 39  Yearly exam:   See your health care provider every year in order to    Review health changes.     Discuss preventive care.      Review your medicines if you your doctor has prescribed any.    Until age 30: Get a Pap test every three years (more often if you have had an abnormal result).    After age 30: Talk to your doctor about whether you should have a Pap test every 3 years or have a Pap test with HPV screening every 5 years.   You do not need a Pap test if your uterus was removed (hysterectomy) and you have not had cancer.  You should be tested each year for STDs (sexually transmitted diseases), if you're at risk.   Talk to your provider about how often to have your cholesterol checked.  If you are at risk for diabetes, you should have a diabetes test (fasting glucose).  Shots: Get a flu shot each year. Get a tetanus shot every 10 years.   Nutrition:     Eat at least 5 servings of fruits and vegetables each day.    Eat whole-grain bread, whole-wheat pasta and brown rice instead of white grains and rice.    Get adequate Calcium and Vitamin D.     Lifestyle    Exercise at least 150 minutes a week (30 minutes a day, 5 days of the week). This will help you control your weight and prevent disease.    Limit alcohol to one drink per day.    No smoking.     Wear sunscreen to prevent skin cancer.    See your dentist every six months for an exam and cleaning.

## 2022-03-29 NOTE — PROGRESS NOTES
SUBJECTIVE:   CC: Jessica Scott is an 26 year old woman who presents for preventive health visit.     {  Healthy Habits:     Getting at least 3 servings of Calcium per day:  NO    Bi-annual eye exam:  NO    Dental care twice a year:  NO    Sleep apnea or symptoms of sleep apnea:  Daytime drowsiness    Diet:  Other    Frequency of exercise:  4-5 days/week    Duration of exercise:  45-60 minutes    Taking medications regularly:  Yes    PHQ-2 Total Score: 1    Additional concerns today:  Yes      Pt feels that spironolactone is not helpful and wants to discontinue medication. She still has a little peach fuzz which is not bothersome.     Some nights she can fall asleep but has difficulty falling asleep. She is more tired the nights where sleep is interrupted. She has     Today's PHQ-2 Score:   PHQ-2 ( 1999 Pfizer) 3/23/2022   Q1: Little interest or pleasure in doing things 0   Q2: Feeling down, depressed or hopeless 1   PHQ-2 Score 1   PHQ-2 Total Score (12-17 Years)- Positive if 3 or more points; Administer PHQ-A if positive -   Q1: Little interest or pleasure in doing things Not at all   Q2: Feeling down, depressed or hopeless Several days   PHQ-2 Score 1       Abuse: Current or Past (Physical, Sexual or Emotional) - No  Do you feel safe in your environment? Yes    Have you ever done Advance Care Planning? (For example, a Health Directive, POLST, or a discussion with a medical provider or your loved ones about your wishes): No, advance care planning information given to patient to review.  Patient plans to discuss their wishes with loved ones or provider.      Social History     Tobacco Use     Smoking status: Former Smoker     Smokeless tobacco: Never Used   Substance Use Topics     Alcohol use: Yes     Comment: 1 drink a week      If you drink alcohol do you typically have >3 drinks per day or >7 drinks per week? No    Alcohol Use 3/23/2022   Prescreen: >3 drinks/day or >7 drinks/week? No   No flowsheet data  found.    Reviewed orders with patient.  Reviewed health maintenance and updated orders accordingly - Yes      Breast Cancer Screening:    FHS-7:   Breast CA Risk Assessment (FHS-7) 3/23/2022   Did any of your first-degree relatives have breast or ovarian cancer? No   Did any of your relatives have bilateral breast cancer? No   Did any man in your family have breast cancer? No   Did any woman in your family have breast and ovarian cancer? No   Did any woman in your family have breast cancer before age 50 y? No   Do you have 2 or more relatives with breast and/or ovarian cancer? No   Do you have 2 or more relatives with breast and/or bowel cancer? No       Pertinent mammograms are reviewed under the imaging tab.    History of abnormal Pap smear: NO - age 21-29 PAP every 3 years recommended  PAP / HPV 11/18/2019   PAP (Historical) NIL     Reviewed and updated as needed this visit by clinical staff                  Reviewed and updated as needed this visit by Provider                     Review of Systems   Constitutional: Negative for chills and fever.   HENT: Negative for congestion, ear pain, hearing loss and sore throat.    Eyes: Negative for pain and visual disturbance.   Respiratory: Negative for cough and shortness of breath.    Cardiovascular: Negative for chest pain, palpitations and peripheral edema.   Gastrointestinal: Negative for abdominal pain, constipation, diarrhea, heartburn, hematochezia and nausea.   Breasts:  Negative for tenderness, breast mass and discharge.   Genitourinary: Negative for dysuria, frequency, genital sores, hematuria, pelvic pain, urgency, vaginal bleeding and vaginal discharge.   Musculoskeletal: Negative for arthralgias, joint swelling and myalgias.   Skin: Negative for rash.   Neurological: Negative for dizziness, weakness, headaches and paresthesias.   Psychiatric/Behavioral: Negative for mood changes. The patient is not nervous/anxious.           OBJECTIVE:   Physical Exam   BP  "122/60 (BP Location: Left arm, Patient Position: Sitting, Cuff Size: Adult Regular)   Pulse 63   Temp 97.9  F (36.6  C) (Temporal)   Resp 16   Ht 1.595 m (5' 2.78\")   Wt 77.6 kg (171 lb)   LMP 02/25/2022 (Exact Date)   SpO2 98%   BMI 30.50 kg/m    GENERAL: healthy, alert and no distress  EYES: Eyes grossly normal to inspection, conjunctivae and sclerae normal  HENT: ear canals and TM's normal  NECK: no adenopathy, no asymmetry, masses, or scars and thyroid normal to palpation  RESP: lungs clear to auscultation - no rales, rhonchi or wheezes  BREAST: normal without masses, tenderness or nipple discharge and no palpable axillary masses or adenopathy  CV: regular rate and rhythm, normal S1 S2  ABDOMEN: soft, nontender, no hepatosplenomegaly, no masses and bowel sounds normal  MS: no gross musculoskeletal defects noted, no edema  SKIN: no suspicious lesions or rashes  NEURO: Normal strength and tone, mentation intact and speech normal  PSYCH: mentation appears normal, affect normal    Diagnostic Test Results:  Labs reviewed in Epic    ASSESSMENT/PLAN:     Routine general medical examination at a health care facility  PAP due 11/18/2022    PCOS (polycystic ovarian syndrome)  - Pt would like to discontinue spironolactone, not effective and continue metformin.  - metFORMIN (GLUCOPHAGE) 500 MG tablet; Take 1 tablet (500 mg) by mouth daily (with breakfast)  Dispense: 90 tablet; Refill: 3  - Hemoglobin A1c; Future    Episode of recurrent major depressive disorder, unspecified depression episode severity (H)  PHQ 7/14/2021 10/12/2021 3/29/2022   PHQ-9 Total Score 5 2 4   Q9: Thoughts of better off dead/self-harm past 2 weeks Not at all Not at all Not at all   - stable, refilled below  - citalopram (CELEXA) 10 MG tablet; Take 1 tablet (10 mg) by mouth daily  Dispense: 90 tablet; Refill: 3  - Vitamin D Deficiency; Future  - TSH; Future  - CBC with platelets; Future  - Comprehensive metabolic panel (BMP + Alb, Alk Phos, " "ALT, AST, Total. Bili, TP); Future    Need for hepatitis C screening test  - Hepatitis C Screen Reflex to HCV RNA Quant and Genotype; Future    Lipid screening  - Lipid Profile (Chol, Trig, HDL, LDL calc); Future    Insomnia  Advised below  Melatonin up to 10 mg, 30 minutes before bedtime.   Follow if symptoms worsen or fail to improv    Patient has been advised of split billing requirements and indicates understanding: Yes    COUNSELING:  Reviewed preventive health counseling, as reflected in patient instructions    Estimated body mass index is 29.82 kg/m  as calculated from the following:    Height as of 7/14/21: 1.594 m (5' 2.75\").    Weight as of 10/8/21: 75.8 kg (167 lb).        She reports that she has quit smoking. She has never used smokeless tobacco.      Counseling Resources:  ATP IV Guidelines  Pooled Cohorts Equation Calculator  Breast Cancer Risk Calculator  BRCA-Related Cancer Risk Assessment: FHS-7 Tool  FRAX Risk Assessment  ICSI Preventive Guidelines  Dietary Guidelines for Americans, 2010  USDA's MyPlate  ASA Prophylaxis  Lung CA Screening    Emily Guo MD  [facility]  "

## 2022-03-30 ASSESSMENT — ANXIETY QUESTIONNAIRES: GAD7 TOTAL SCORE: 2

## 2022-04-12 NOTE — TELEPHONE ENCOUNTER
spironolactone (ALDACTONE) 50 MG tablet (Discontinued)      Last Written Prescription Date:  10-24-21  Last Fill Quantity: 30 tab,   # refills: 5  Last Office Visit: 3-29-22  Future Office visit:       Routing refill request to provider for review/approval because:  Drug not active on patient's medication list

## 2022-07-07 ENCOUNTER — MYC MEDICAL ADVICE (OUTPATIENT)
Dept: FAMILY MEDICINE | Facility: CLINIC | Age: 27
End: 2022-07-07

## 2022-07-07 DIAGNOSIS — E28.2 PCOS (POLYCYSTIC OVARIAN SYNDROME): ICD-10-CM

## 2022-07-11 RX ORDER — DROSPIRENONE AND ETHINYL ESTRADIOL 0.02-3(28)
1 KIT ORAL DAILY
Qty: 84 TABLET | Refills: 0 | Status: SHIPPED | OUTPATIENT
Start: 2022-07-11 | End: 2022-10-04

## 2022-07-11 NOTE — TELEPHONE ENCOUNTER
Writer responded via [employer].      TJ BeauchampN, RN  [facility]    Warnings Override History for drospirenone-ethinyl estradiol (LORYNA) 3-0.02 MG tablet [[id number]]    Overridden by Chuy Cintron MD on Oct 12, 2021 5:19 PM   Allergy/Contraindication   1. ESTRADIOL [Level: Drug Class Match]

## 2022-09-18 ENCOUNTER — HEALTH MAINTENANCE LETTER (OUTPATIENT)
Age: 27
End: 2022-09-18

## 2022-11-07 ENCOUNTER — MYC MEDICAL ADVICE (OUTPATIENT)
Dept: FAMILY MEDICINE | Facility: CLINIC | Age: 27
End: 2022-11-07

## 2022-11-08 ENCOUNTER — OFFICE VISIT (OUTPATIENT)
Dept: FAMILY MEDICINE | Facility: CLINIC | Age: 27
End: 2022-11-08
Payer: COMMERCIAL

## 2022-11-08 VITALS
OXYGEN SATURATION: 99 % | HEART RATE: 77 BPM | SYSTOLIC BLOOD PRESSURE: 131 MMHG | DIASTOLIC BLOOD PRESSURE: 81 MMHG | TEMPERATURE: 98.6 F

## 2022-11-08 DIAGNOSIS — J20.9 ACUTE BRONCHITIS, UNSPECIFIED ORGANISM: Primary | ICD-10-CM

## 2022-11-08 PROCEDURE — 99213 OFFICE O/P EST LOW 20 MIN: CPT

## 2022-11-08 RX ORDER — AZITHROMYCIN 250 MG/1
TABLET, FILM COATED ORAL
Qty: 6 TABLET | Refills: 0 | Status: SHIPPED | OUTPATIENT
Start: 2022-11-08 | End: 2022-11-13

## 2022-11-09 NOTE — PROGRESS NOTES
Assessment & Plan     Acute bronchitis, unspecified organism    - azithromycin (ZITHROMAX) 250 MG tablet  Dispense: 6 tablet; Refill: 0       Patient Instructions   Mucinex is product known to help loosen congestion (generic is guaifenesin)   Delsym 12 hour over the counter works well for cough.  WArm steamy shower  Follow-up with primary care provider symptoms persist more than 2 weeks or symptoms worsen. A chest X-ray may be needed to diagnose persistent symptoms.  If you develop trouble breathing, swallowing or cough-up blood, present to emergency room.       Return in about 1 week (around 11/15/2022), or if symptoms worsen or fail to improve.    [facility]    Donnell Murdock is a 27 year old female who presents to clinic today for the following health issues:  Chief Complaint   Patient presents with     Nasal Congestion     Cough     Green phlegm along with cough, is prone to bronchitis, COVID test was negative Friday      Shortness of Breath     Generalized Body Aches     Fatigue     Patient reports that she started getting sick 4 days ago. It started with a sore throat, moved to her sinuses, and then went to her chest with a productive cough of green phlegm. She has had a little wheezing, and it was hard to catch her breath, No fever/chills/sweats. History of getting bronchitis in the past.         Review of Systems        Objective    /81 (BP Location: Right arm, Patient Position: Sitting, Cuff Size: Adult Regular)   Pulse 77   Temp 98.6  F (37  C) (Oral)   SpO2 99%   Physical Exam  Constitutional:       General: She is not in acute distress.     Appearance: Normal appearance. She is normal weight.   HENT:      Head: Normocephalic and atraumatic.      Right Ear: Tympanic membrane, ear canal and external ear normal.      Left Ear: Tympanic membrane, ear canal and external ear normal.      Nose: Nose normal.       Mouth/Throat:      Mouth: Mucous membranes are moist.      Pharynx: Oropharynx is clear.   Eyes:      Conjunctiva/sclera: Conjunctivae normal.      Pupils: Pupils are equal, round, and reactive to light.   Cardiovascular:      Rate and Rhythm: Normal rate and regular rhythm.      Pulses: Normal pulses.      Heart sounds: Normal heart sounds.   Pulmonary:      Effort: Pulmonary effort is normal.      Breath sounds: Normal breath sounds. No wheezing, rhonchi or rales.      Comments: Frequent cough  Musculoskeletal:      Cervical back: Tenderness present.   Lymphadenopathy:      Cervical: Cervical adenopathy present.   Skin:     General: Skin is warm and dry.   Neurological:      General: No focal deficit present.      Mental Status: She is alert and oriented to person, place, and time.   Psychiatric:         Mood and Affect: Mood normal.         Behavior: Behavior normal.         Thought Content: Thought content normal.         Judgment: Judgment normal.

## 2022-11-09 NOTE — PATIENT INSTRUCTIONS
Mucinex is product known to help loosen congestion (generic is guaifenesin)   Delsym 12 hour over the counter works well for cough.  WArm steamy shower  Follow-up with primary care provider symptoms persist more than 2 weeks or symptoms worsen. A chest X-ray may be needed to diagnose persistent symptoms.  If you develop trouble breathing, swallowing or cough-up blood, present to emergency room.

## 2022-12-27 ASSESSMENT — ENCOUNTER SYMPTOMS
CHILLS: 0
FEVER: 0
HEADACHES: 1
HEMATOCHEZIA: 0
HEARTBURN: 0
EYE PAIN: 0
PARESTHESIAS: 0
CONSTIPATION: 0
ABDOMINAL PAIN: 0
HEMATURIA: 0
NAUSEA: 0
WEAKNESS: 0
DYSURIA: 0
NERVOUS/ANXIOUS: 0
JOINT SWELLING: 0
FREQUENCY: 0
COUGH: 0
SHORTNESS OF BREATH: 0
ARTHRALGIAS: 0
DIARRHEA: 0
DIZZINESS: 0
BREAST MASS: 0
SORE THROAT: 0
PALPITATIONS: 0
MYALGIAS: 0

## 2023-01-02 ENCOUNTER — OFFICE VISIT (OUTPATIENT)
Dept: FAMILY MEDICINE | Facility: CLINIC | Age: 28
End: 2023-01-02
Payer: COMMERCIAL

## 2023-01-02 VITALS
OXYGEN SATURATION: 99 % | WEIGHT: 178 LBS | DIASTOLIC BLOOD PRESSURE: 69 MMHG | SYSTOLIC BLOOD PRESSURE: 100 MMHG | HEIGHT: 63 IN | TEMPERATURE: 97.9 F | BODY MASS INDEX: 31.54 KG/M2 | RESPIRATION RATE: 15 BRPM | HEART RATE: 73 BPM

## 2023-01-02 DIAGNOSIS — E28.2 PCOS (POLYCYSTIC OVARIAN SYNDROME): ICD-10-CM

## 2023-01-02 DIAGNOSIS — Z12.4 CERVICAL CANCER SCREENING: ICD-10-CM

## 2023-01-02 DIAGNOSIS — Z00.00 ROUTINE GENERAL MEDICAL EXAMINATION AT A HEALTH CARE FACILITY: ICD-10-CM

## 2023-01-02 DIAGNOSIS — F33.9 EPISODE OF RECURRENT MAJOR DEPRESSIVE DISORDER, UNSPECIFIED DEPRESSION EPISODE SEVERITY (H): ICD-10-CM

## 2023-01-02 PROCEDURE — 99214 OFFICE O/P EST MOD 30 MIN: CPT | Mod: 25 | Performed by: FAMILY MEDICINE

## 2023-01-02 PROCEDURE — G0145 SCR C/V CYTO,THINLAYER,RESCR: HCPCS | Performed by: FAMILY MEDICINE

## 2023-01-02 PROCEDURE — 99395 PREV VISIT EST AGE 18-39: CPT | Performed by: FAMILY MEDICINE

## 2023-01-02 RX ORDER — DROSPIRENONE AND ETHINYL ESTRADIOL 0.02-3(28)
1 KIT ORAL DAILY
Qty: 84 TABLET | Refills: 3 | Status: SHIPPED | OUTPATIENT
Start: 2023-01-02 | End: 2023-12-06

## 2023-01-02 RX ORDER — CITALOPRAM HYDROBROMIDE 10 MG/1
10 TABLET ORAL DAILY
Qty: 90 TABLET | Refills: 3 | Status: SHIPPED | OUTPATIENT
Start: 2023-01-02 | End: 2023-12-13

## 2023-01-02 ASSESSMENT — ENCOUNTER SYMPTOMS
PARESTHESIAS: 0
ABDOMINAL PAIN: 0
COUGH: 0
MYALGIAS: 0
DIARRHEA: 0
HEMATURIA: 0
FEVER: 0
CHILLS: 0
HEMATOCHEZIA: 0
PALPITATIONS: 0
NAUSEA: 0
DYSURIA: 0
HEARTBURN: 0
FREQUENCY: 0
DIZZINESS: 0
WEAKNESS: 0
SHORTNESS OF BREATH: 0
EYE PAIN: 0
JOINT SWELLING: 0
BREAST MASS: 0
ARTHRALGIAS: 0
NERVOUS/ANXIOUS: 0
SORE THROAT: 0
CONSTIPATION: 0
HEADACHES: 1

## 2023-01-02 ASSESSMENT — PATIENT HEALTH QUESTIONNAIRE - PHQ9
SUM OF ALL RESPONSES TO PHQ QUESTIONS 1-9: 6
SUM OF ALL RESPONSES TO PHQ QUESTIONS 1-9: 6
10. IF YOU CHECKED OFF ANY PROBLEMS, HOW DIFFICULT HAVE THESE PROBLEMS MADE IT FOR YOU TO DO YOUR WORK, TAKE CARE OF THINGS AT HOME, OR GET ALONG WITH OTHER PEOPLE: NOT DIFFICULT AT ALL

## 2023-01-02 ASSESSMENT — PAIN SCALES - GENERAL: PAINLEVEL: NO PAIN (0)

## 2023-01-02 NOTE — PROGRESS NOTES
SUBJECTIVE:   CC: Collette is an 27 year old who presents for preventive health visit.   Patient has been advised of split billing requirements and indicates understanding: Yes  Healthy Habits:     Getting at least 3 servings of Calcium per day:  Yes    Bi-annual eye exam:  Yes    Dental care twice a year:  Yes    Sleep apnea or symptoms of sleep apnea:  Daytime drowsiness    Diet:  Regular (no restrictions)    Frequency of exercise:  4-5 days/week    Duration of exercise:  45-60 minutes    Taking medications regularly:  Yes    Medication side effects:  Not applicable    PHQ-2 Total Score: 2    Additional concerns today:  Yes        Today's PHQ-2 Score:   PHQ-2 ( 1999 Pfizer) 12/27/2022   Q1: Little interest or pleasure in doing things 1   Q2: Feeling down, depressed or hopeless 1   PHQ-2 Score 2   PHQ-2 Total Score (12-17 Years)- Positive if 3 or more points; Administer PHQ-A if positive -   Q1: Little interest or pleasure in doing things Several days   Q2: Feeling down, depressed or hopeless Several days   PHQ-2 Score 2           Social History     Tobacco Use     Smoking status: Former     Packs/day: 0.00     Years: 0.00     Pack years: 0.00     Types: Cigarettes     Smokeless tobacco: Never   Substance Use Topics     Alcohol use: Not Currently     Comment: NOT everyday. Casual     If you drink alcohol do you typically have >3 drinks per day or >7 drinks per week? No    Alcohol Use 12/27/2022   Prescreen: >3 drinks/day or >7 drinks/week? No   Prescreen: >3 drinks/day or >7 drinks/week? -       Reviewed orders with patient.  Reviewed health maintenance and updated orders accordingly - Yes      Breast Cancer Screening:    FHS-7:   Breast CA Risk Assessment (FHS-7) 3/23/2022 12/27/2022   Did any of your first-degree relatives have breast or ovarian cancer? No No   Did any of your relatives have bilateral breast cancer? No Unknown   Did any man in your family have breast cancer? No No   Did any woman in your  "family have breast and ovarian cancer? No No    Did any woman in your family have breast cancer before age 50 y? No Unknown   Do you have 2 or more relatives with breast and/or ovarian cancer? No Unknown   Do you have 2 or more relatives with breast and/or bowel cancer? No Unknown     Paternal grandmother had ovarian cancer and not breast cancer.       Pertinent mammograms are reviewed under the imaging tab.    History of abnormal Pap smear: NO - age 30- 65 PAP every 3 years recommended  PAP / HPV 11/18/2019   PAP (Historical) NIL     Reviewed and updated as needed this visit by clinical staff   Tobacco  Allergies               Reviewed and updated as needed this visit by Provider                     Review of Systems   Constitutional: Negative for chills and fever.   HENT: Positive for ear pain. Negative for congestion, hearing loss and sore throat.    Eyes: Negative for pain and visual disturbance.   Respiratory: Negative for cough and shortness of breath.    Cardiovascular: Negative for chest pain, palpitations and peripheral edema.   Gastrointestinal: Negative for abdominal pain, constipation, diarrhea, heartburn, hematochezia and nausea.   Breasts:  Negative for tenderness, breast mass and discharge.   Genitourinary: Negative for dysuria, frequency, genital sores, hematuria, pelvic pain, urgency, vaginal bleeding and vaginal discharge.   Musculoskeletal: Negative for arthralgias, joint swelling and myalgias.   Skin: Negative for rash.   Neurological: Positive for headaches. Negative for dizziness, weakness and paresthesias.   Psychiatric/Behavioral: Negative for mood changes. The patient is not nervous/anxious.      OBJECTIVE:   Physical Exam   /69 (BP Location: Left arm, Patient Position: Sitting, Cuff Size: Adult Large)   Pulse 73   Temp 97.9  F (36.6  C) (Temporal)   Resp 15   Ht 1.6 m (5' 3\")   Wt 80.7 kg (178 lb)   LMP 12/09/2022 (Within Days)   SpO2 99%   BMI 31.53 kg/m    GENERAL: " healthy, alert and no distress  EYES: Eyes grossly normal to inspection,conjunctivae and sclerae normal  HENT: ear canals and TM's normal  NECK: no adenopathy, no asymmetry, masses, or scars and thyroid normal to palpation  RESP: lungs clear to auscultation - no rales, rhonchi or wheezes  CV: regular rate and rhythm, normal S1 S2  ABDOMEN: soft, nontender, no hepatosplenomegaly, no masses and bowel sounds normal   (female): normal female external genitalia, normal urethral meatus, vaginal mucosa pink, moist, well rugated, and normal cervix without masses or discharge  MS: no gross musculoskeletal defects noted, no edema  SKIN: no suspicious lesions or rashes  NEURO: Normal strength and tone, mentation intact and speech normal  PSYCH: mentation appears normal, affect normal    Diagnostic Test Results:  Labs reviewed in Epic    ASSESSMENT/PLAN:     1. Routine general medical examination at a health care facility  - Immunizations will schedule at pharmacy     2. Episode of recurrent major depressive disorder, unspecified depression episode severity (H)  PHQ 10/12/2021 3/29/2022 1/2/2023   PHQ-9 Total Score 2 4 6   Q9: Thoughts of better off dead/self-harm past 2 weeks Not at all Not at all Not at all     - citalopram (CELEXA) 10 MG tablet; Take 1 tablet (10 mg) by mouth daily  Dispense: 90 tablet; Refill: 3    3. PCOS (polycystic ovarian syndrome)  - drospirenone-ethinyl estradiol (LORYNA) 3-0.02 MG tablet; Take 1 tablet by mouth daily  Dispense: 84 tablet; Refill: 3  - metFORMIN (GLUCOPHAGE) 500 MG tablet; Take 1 tablet (500 mg) by mouth daily (with breakfast)  Dispense: 90 tablet; Refill: 3    4. Cervical cancer screening  - Pap Screen reflex to HPV if ASCUS - recommend age 25 - 29    Patient has been advised of split billing requirements and indicates understanding: Yes      COUNSELING:  Reviewed preventive health counseling, as reflected in patient instructions      BMI:   Estimated body mass index is 30.5 kg/m   "as calculated from the following:    Height as of 3/29/22: 1.595 m (5' 2.78\").    Weight as of 3/29/22: 77.6 kg (171 lb).         She reports that she has quit smoking. Her smoking use included cigarettes. She has never used smokeless tobacco.          Emily Guo MD  [facility]  Answers for HPI/ROS submitted by the patient on 1/2/2023  If you checked off any problems, how difficult have these problems made it for you to do your work, take care of things at home, or get along with other people?: Not difficult at all  PHQ9 TOTAL SCORE: 6      "

## 2023-01-04 LAB
BKR LAB AP GYN ADEQUACY: NORMAL
BKR LAB AP GYN INTERPRETATION: NORMAL
BKR LAB AP HPV REFLEX: NORMAL
BKR LAB AP PREVIOUS ABNORMAL: NORMAL
PATH REPORT.COMMENTS IMP SPEC: NORMAL
PATH REPORT.COMMENTS IMP SPEC: NORMAL
PATH REPORT.RELEVANT HX SPEC: NORMAL

## 2023-01-05 DIAGNOSIS — F33.9 EPISODE OF RECURRENT MAJOR DEPRESSIVE DISORDER, UNSPECIFIED DEPRESSION EPISODE SEVERITY (H): ICD-10-CM

## 2023-01-05 RX ORDER — CITALOPRAM HYDROBROMIDE 10 MG/1
10 TABLET ORAL DAILY
Qty: 90 TABLET | Refills: 3 | OUTPATIENT
Start: 2023-01-05

## 2023-12-09 DIAGNOSIS — F33.9 EPISODE OF RECURRENT MAJOR DEPRESSIVE DISORDER, UNSPECIFIED DEPRESSION EPISODE SEVERITY (H): ICD-10-CM

## 2023-12-13 ENCOUNTER — MYC MEDICAL ADVICE (OUTPATIENT)
Dept: FAMILY MEDICINE | Facility: CLINIC | Age: 28
End: 2023-12-13
Payer: COMMERCIAL

## 2023-12-13 DIAGNOSIS — S69.92XA INJURY OF LEFT HAND, INITIAL ENCOUNTER: Primary | ICD-10-CM

## 2023-12-13 RX ORDER — CITALOPRAM HYDROBROMIDE 10 MG/1
10 TABLET ORAL DAILY
Qty: 90 TABLET | Refills: 0 | Status: SHIPPED | OUTPATIENT
Start: 2023-12-13 | End: 2024-01-24

## 2023-12-14 NOTE — PROGRESS NOTES
ASSESSMENT & PLAN    Collette was seen today for pain.    Diagnoses and all orders for this visit:    Sprain of metacarpophalangeal (MCP) joint of left thumb, initial encounter    Injury of left hand, initial encounter  -     Orthopedic  Referral  -     XR Hand Left G/E 3 Views; Future      Sprain of radial ligament of thumb  Weight . Some hypermobility of metacarpal phalangeal joint of left thumb. She is left hand dominant. Pain localized to lateral MCP joint.   -Per my interpretation: Xrays without acute fracture. Will await final radiology read.  -Will immobilize for the next two weeks and avoid exacerbating activities. Thumb splint  -If not improved can consider further imaging at future appt.  -Tylenol 500-1000mg (up to three times per day) and ibuprofen 600mg (up to three times per day) as needed for pain and swelling. Always take ibuprofen with food.   -Follow-up 2-3 weeks      [facility]    -----  Chief Complaint   Patient presents with    Left Hand - Pain       SUBJECTIVE  Collette Scott is a/an 28 year old female who is seen in consultation at the request of  Emily Guo M.D. for evaluation of left hand pain.     The patient is seen by themselves.  The patient is Left handed    Onset: 1 month(s) ago. Patient describes injury as slammer her hand in a door  Location of Pain: left hand, near thumb and MCP joint of thumb  Worsened by: gripping/ grasping, fine motor movements  Better with: rest/avoidance.   Treatments tried: rest/activity avoidance  Associated symptoms: Was swollen initially, today it is not. Sharp pains with usage, otherwise dull aches. Some pain radiated to thumb extensor region.     Orthopedic/Surgical history: NO  Social History/Occupation: typing  (space bar aggravates).    Patient Active Problem List   Diagnosis    Episode of recurrent major depressive disorder, unspecified depression episode severity  (H24)       Current Outpatient Medications   Medication Sig Dispense Refill    citalopram (CELEXA) 10 MG tablet TAKE 1 TABLET (10 MG) BY MOUTH DAILY. 90 tablet 0    drospirenone-ethinyl estradiol (VESTURA) 3-0.02 MG tablet TAKE 1 TABLET BY MOUTH EVERY DAY 84 tablet 0    metFORMIN (GLUCOPHAGE) 500 MG tablet Take 1 tablet (500 mg) by mouth daily (with breakfast) 90 tablet 3       PMH, Medications and Allergies were reviewed and updated as needed.    REVIEW OF SYSTEMS:  10 point ROS is negative other than symptoms noted above in HPI, Past Medical History or as stated below  Constitutional: NEGATIVE for fever, chills, change in weight  Skin: NEGATIVE for worrisome rashes, moles or lesions  GI/: NEGATIVE for bowel or bladder changes  Neuro: NEGATIVE for weakness, dizziness or paresthesias      OBJECTIVE:  Wt 79.4 kg (175 lb)   BMI 31.00 kg/m     General: healthy, alert and in no distress  Skin: no suspicious lesions or rash.  CV: distal perfusion intact cap refill < 2 seconds  Resp: normal respiratory effort without conversational dyspnea   Psych: normal mood and affect  Gait: NORMAL  Neuro: Normal light sensory exam of UE left hand    LEFT HAND  Inspection:    No swelling, bruising, discoloration, or obvious deformity or asymmetry  Palpation:  Tenderness lateral MCP joint thumb  Pain with adduction of thumb  No other finger tenderness  Range of Motion:    Full active flexion and extension at MCP, PIP, and DIP joints; normal finger cascade without malrotation.  Wrist pronation, supination, and ulnar/radial deviation normal.  Strength:     full 5/5  Special Tests:    Positive: no UCL laxity but mild hypermobility of bilateral thumbs  Pain with ulnar deviation of left thumb.     Negative: UCL laxity, CMC grind, palpable triggering, hypothenar eminence wasting, Finkelstein's       RADIOLOGY:  Final results and radiologist's interpretation, available in the [facility] health record.  Images were reviewed with the patient in  the office today.    My personal interpretation of the performed imaging:   Left thumb without acute fracture or dislocation. Will await final radiology read.

## 2023-12-15 ENCOUNTER — ANCILLARY PROCEDURE (OUTPATIENT)
Dept: GENERAL RADIOLOGY | Facility: CLINIC | Age: 28
End: 2023-12-15
Attending: STUDENT IN AN ORGANIZED HEALTH CARE EDUCATION/TRAINING PROGRAM
Payer: COMMERCIAL

## 2023-12-15 ENCOUNTER — OFFICE VISIT (OUTPATIENT)
Dept: ORTHOPEDICS | Facility: CLINIC | Age: 28
End: 2023-12-15
Attending: FAMILY MEDICINE
Payer: COMMERCIAL

## 2023-12-15 VITALS — BODY MASS INDEX: 31 KG/M2 | WEIGHT: 175 LBS

## 2023-12-15 DIAGNOSIS — S69.92XA INJURY OF LEFT HAND, INITIAL ENCOUNTER: ICD-10-CM

## 2023-12-15 DIAGNOSIS — S63.642A SPRAIN OF METACARPOPHALANGEAL (MCP) JOINT OF LEFT THUMB, INITIAL ENCOUNTER: Primary | ICD-10-CM

## 2023-12-15 PROCEDURE — 99203 OFFICE O/P NEW LOW 30 MIN: CPT | Performed by: STUDENT IN AN ORGANIZED HEALTH CARE EDUCATION/TRAINING PROGRAM

## 2023-12-15 PROCEDURE — 73130 X-RAY EXAM OF HAND: CPT | Mod: TC | Performed by: RADIOLOGY

## 2023-12-15 NOTE — PATIENT INSTRUCTIONS
1. Sprain of metacarpophalangeal (MCP) joint of left thumb, initial encounter    2. Injury of left hand, initial encounter      - Sprain of radial ligament of thumb  -Per my interpretation: Xrays without acute fracture. Will await final radiology read.  -Will immobilize for the next two weeks and avoid exacerbating activities  -Tylenol 500-1000mg (up to three times per day) and ibuprofen 600mg (up to three times per day) as needed for pain and swelling. Always take ibuprofen with food.   -Follow-up 2-3 weeks    Please call [phone]  Ask for my team if you have any questions or concerns    Louisa Cadena DO  [facility]      Thank you for choosing [facility]!    CLINIC LOCATIONS:     [place]  TRIAGE LINE: [phone] [address] [facility] APPOINTMENTS: [phone]   [place] [address] RADIOLOGY: [phone]   (Thursday & Friday) PHYSICAL THERAPY: [phone]    BILLING QUESTIONS: [phone]   [place] FAX: [phone] [id number]    [place] [address]    (Monday & Wednesday

## 2023-12-15 NOTE — LETTER
12/15/2023         RE: Collette Scott  [address]  [facility] [address]        Dear Colleague,    Thank you for referring your patient, Collette Scott, to the [facility]. Please see a copy of my visit note below.    ASSESSMENT & PLAN    Collette was seen today for pain.    Diagnoses and all orders for this visit:    Sprain of metacarpophalangeal (MCP) joint of left thumb, initial encounter    Injury of left hand, initial encounter  -     Orthopedic  Referral  -     XR Hand Left G/E 3 Views; Future      Sprain of radial ligament of thumb  Weight . Some hypermobility of metacarpal phalangeal joint of left thumb. She is left hand dominant. Pain localized to lateral MCP joint.   -Per my interpretation: Xrays without acute fracture. Will await final radiology read.  -Will immobilize for the next two weeks and avoid exacerbating activities. Thumb splint  -If not improved can consider further imaging at future appt.  -Tylenol 500-1000mg (up to three times per day) and ibuprofen 600mg (up to three times per day) as needed for pain and swelling. Always take ibuprofen with food.   -Follow-up 2-3 weeks      Louisa Cadena DO  [facility] SPORTS [facility]    -----  Chief Complaint   Patient presents with     Left Hand - Pain       SUBJECTIVE  Collette Scott is a/an 28 year old female who is seen in consultation at the request of  Emily Guo M.D. for evaluation of left hand pain.     The patient is seen by themselves.  The patient is Left handed    Onset: 1 month(s) ago. Patient describes injury as slammer her hand in a door  Location of Pain: left hand, near thumb and MCP joint of thumb  Worsened by: gripping/ grasping, fine motor movements  Better with: rest/avoidance.   Treatments tried: rest/activity avoidance  Associated symptoms: Was swollen initially, today it is not. Sharp pains with usage, otherwise dull  aches. Some pain radiated to thumb extensor region.     Orthopedic/Surgical history: NO  Social History/Occupation: typing  (space bar aggravates).    Patient Active Problem List   Diagnosis     Episode of recurrent major depressive disorder, unspecified depression episode severity (H24)       Current Outpatient Medications   Medication Sig Dispense Refill     citalopram (CELEXA) 10 MG tablet TAKE 1 TABLET (10 MG) BY MOUTH DAILY. 90 tablet 0     drospirenone-ethinyl estradiol (VESTURA) 3-0.02 MG tablet TAKE 1 TABLET BY MOUTH EVERY DAY 84 tablet 0     metFORMIN (GLUCOPHAGE) 500 MG tablet Take 1 tablet (500 mg) by mouth daily (with breakfast) 90 tablet 3       PMH, Medications and Allergies were reviewed and updated as needed.    REVIEW OF SYSTEMS:  10 point ROS is negative other than symptoms noted above in HPI, Past Medical History or as stated below  Constitutional: NEGATIVE for fever, chills, change in weight  Skin: NEGATIVE for worrisome rashes, moles or lesions  GI/: NEGATIVE for bowel or bladder changes  Neuro: NEGATIVE for weakness, dizziness or paresthesias      OBJECTIVE:  Wt 79.4 kg (175 lb)   BMI 31.00 kg/m     General: healthy, alert and in no distress  Skin: no suspicious lesions or rash.  CV: distal perfusion intact cap refill < 2 seconds  Resp: normal respiratory effort without conversational dyspnea   Psych: normal mood and affect  Gait: NORMAL  Neuro: Normal light sensory exam of UE left hand    LEFT HAND  Inspection:    No swelling, bruising, discoloration, or obvious deformity or asymmetry  Palpation:  Tenderness lateral MCP joint thumb  Pain with adduction of thumb  No other finger tenderness  Range of Motion:    Full active flexion and extension at MCP, PIP, and DIP joints; normal finger cascade without malrotation.  Wrist pronation, supination, and ulnar/radial deviation normal.  Strength:     full 5/5  Special Tests:    Positive: no UCL laxity but mild hypermobility of bilateral  thumbs  Pain with ulnar deviation of left thumb.     Negative: UCL laxity, CMC grind, palpable triggering, hypothenar eminence wasting, Finkelstein's       RADIOLOGY:  Final results and radiologist's interpretation, available in the [facility] health record.  Images were reviewed with the patient in the office today.    My personal interpretation of the performed imaging:   Left thumb without acute fracture or dislocation. Will await final radiology read.                Again, thank you for allowing me to participate in the care of your patient.        Sincerely,        Louisa Cadena MD

## 2023-12-15 NOTE — LETTER
December 15, 2023      Jessica Scott  [address]  [facility] [address]        To Whom It May Concern:    Jessica Scott was seen in our clinic. She may return to work without restrictions.      Limit use of left hand and left thumb including activities. Writing as tolerated.         Sincerely,        Louisa Cadena, DO

## 2024-01-10 NOTE — PROGRESS NOTES
ASSESSMENT & PLAN    Collette was seen today for pain and follow up.    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 Still with significant pain MCP, APL, and EPB. Now presenting with some scaphoid tenderness as well. Xrays left hand reviewed from previous overall reassuring without bony deformity however given no improvement with brace and tylenol will get MRI to further evaluate for bony injury versus ligamentous injury given trauma, hyperlaxity and scaphoid tenderness.     Will follow-up after MRI  - Medications NSAIDs: mobic 15mg daily for 30 days. Take with food. Sent to pharmacy.   - MRI. Call to schedule  -Continue to wear brace  -Follow-up after MRI    Return sooner if develops new or worsening symptoms.    Options for treatment and/or follow-up care were reviewed with the patient was actively involved in the decision making process. Patient verbalized understanding and was in agreement with the plan.    >30 minutes spent on the date of the encounter doing chart reivew, history and exam, documentation and further activities as noted above with the exception of procedures.    Louisa Cadena DO  [facility] SPORTS MEDICINE CLINIC [facility]    SUBJECTIVE- January 18, 2024    Chief Complaint   Patient presents with    Right Hand - Pain, Follow Up     Collette Scott is a 28 year old female who is seen in f/u up for left. Since last visit on 12/15/23 patient has been using a thumb splint.  - Now ~ 1 months from initial onset    Worsened by:  grasping, putting on gloves,   Better with: brace at times is helping  Treatments tried: rest/activity avoidance and casting/splinting/bracing  Associated symptoms:  stiff and throbbing pain, denies numbness and tingling in the hand.     PMH, Medications and Allergies were reviewed and updated as needed.    ROS:  As noted above otherwise negative.    Patient Active Problem List   Diagnosis    Episode of recurrent major depressive disorder, unspecified depression episode severity (H24)       Current Outpatient Medications   Medication Sig Dispense Refill    citalopram (CELEXA) 10 MG tablet TAKE 1 TABLET (10 MG) BY MOUTH DAILY. 90 tablet 0    drospirenone-ethinyl estradiol (VESTURA) 3-0.02 MG tablet TAKE 1 TABLET BY MOUTH EVERY DAY 84 tablet 0    metFORMIN (GLUCOPHAGE) 500 MG tablet Take 1 tablet (500 mg) by mouth daily (with breakfast) 90 tablet 3         OBJECTIVE:  /70   Wt 79.4 kg (175 lb)   BMI 31.00 kg/m     General: healthy, alert and in no distress  Skin: no suspicious lesions or rash.  CV: distal perfusion intact distal thumb  Resp: normal respiratory effort without conversational dyspnea   Psych: normal mood and affect  Gait: NORMAL  Neuro: Normal light sensory exam of distal thumb extremity     LEFT HAND  Inspection:    No swelling, bruising, discoloration, or obvious deformity or asymmetry  Palpation:  Tenderness APL EPB scaphoid MCP.   Range of Motion:    Full active flexion and extension at MCP, PIP, and DIP joints; normal finger cascade without malrotation.  Wrist pronation, supination, and ulnar/radial deviation normal.  But painful  Strength:     full  Special Tests:    Positive: UCL laxity bilaterally  No crepitus CMC MCP thumb      RADIOLOGY:  Final results and radiologist's interpretation, available in the [facility] health record.  Images were reviewed with the patient in the office today.  Previously reviewed imaging and agree with interpretation as below:   Narrative & Impression   EXAM:  XR HAND LEFT G/E 3 VIEWS  LOCATION: [facility]  DATE: 12/15/2023     INDICATION:  Injury of left hand, initial encounter  COMPARISON: None.                                                                      IMPRESSION: Normal joint spaces and alignment. No fracture.

## 2024-01-18 ENCOUNTER — OFFICE VISIT (OUTPATIENT)
Dept: ORTHOPEDICS | Facility: CLINIC | Age: 29
End: 2024-01-18
Payer: COMMERCIAL

## 2024-01-18 VITALS — BODY MASS INDEX: 31 KG/M2 | DIASTOLIC BLOOD PRESSURE: 70 MMHG | WEIGHT: 175 LBS | SYSTOLIC BLOOD PRESSURE: 118 MMHG

## 2024-01-18 DIAGNOSIS — S63.642A SPRAIN OF METACARPOPHALANGEAL (MCP) JOINT OF LEFT THUMB, INITIAL ENCOUNTER: Primary | ICD-10-CM

## 2024-01-18 DIAGNOSIS — S69.92XD INJURY OF FINGER OF LEFT HAND, SUBSEQUENT ENCOUNTER: ICD-10-CM

## 2024-01-18 PROCEDURE — 99214 OFFICE O/P EST MOD 30 MIN: CPT | Performed by: STUDENT IN AN ORGANIZED HEALTH CARE EDUCATION/TRAINING PROGRAM

## 2024-01-18 RX ORDER — MELOXICAM 15 MG/1
15 TABLET ORAL DAILY
Qty: 30 TABLET | Refills: 0 | Status: SHIPPED | OUTPATIENT
Start: 2024-01-18

## 2024-01-18 ASSESSMENT — ANXIETY QUESTIONNAIRES
7. FEELING AFRAID AS IF SOMETHING AWFUL MIGHT HAPPEN: NOT AT ALL
8. IF YOU CHECKED OFF ANY PROBLEMS, HOW DIFFICULT HAVE THESE MADE IT FOR YOU TO DO YOUR WORK, TAKE CARE OF THINGS AT HOME, OR GET ALONG WITH OTHER PEOPLE?: NOT DIFFICULT AT ALL
3. WORRYING TOO MUCH ABOUT DIFFERENT THINGS: SEVERAL DAYS
1. FEELING NERVOUS, ANXIOUS, OR ON EDGE: NOT AT ALL
5. BEING SO RESTLESS THAT IT IS HARD TO SIT STILL: NOT AT ALL
GAD7 TOTAL SCORE: 2
IF YOU CHECKED OFF ANY PROBLEMS ON THIS QUESTIONNAIRE, HOW DIFFICULT HAVE THESE PROBLEMS MADE IT FOR YOU TO DO YOUR WORK, TAKE CARE OF THINGS AT HOME, OR GET ALONG WITH OTHER PEOPLE: NOT DIFFICULT AT ALL
6. BECOMING EASILY ANNOYED OR IRRITABLE: SEVERAL DAYS
4. TROUBLE RELAXING: NOT AT ALL
2. NOT BEING ABLE TO STOP OR CONTROL WORRYING: NOT AT ALL
7. FEELING AFRAID AS IF SOMETHING AWFUL MIGHT HAPPEN: NOT AT ALL
GAD7 TOTAL SCORE: 2

## 2024-01-18 NOTE — LETTER
1/18/2024         RE: Collette Scott  [address]  [facility] [address]        Dear Colleague,    Thank you for referring your patient, Collette Scott, to the [facility] Please see a copy of my visit note below.    ASSESSMENT & PLAN    Collette was seen today for pain and follow up.    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 Still with significant pain MCP, APL, and EPB. Now presenting with some scaphoid tenderness as well. Xrays left hand reviewed from previous overall reassuring without bony deformity however given no improvement with brace and tylenol will get MRI to further evaluate for bony injury versus ligamentous injury given trauma, hyperlaxity and scaphoid tenderness.     Will follow-up after MRI  - Medications NSAIDs: mobic 15mg daily for 30 days. Take with food. Sent to pharmacy.   - MRI. Call to schedule  -Continue to wear brace  -Follow-up after MRI    Return sooner if develops new or worsening symptoms.    Options for treatment and/or follow-up care were reviewed with the patient was actively involved in the decision making process. Patient verbalized understanding and was in agreement with the plan.    >30 minutes spent on the date of the encounter doing chart reivew, history and exam, documentation and further activities as noted above with the exception of procedures.    Louisa Cadena DO  [facility]    SUBJECTIVE- January 18,  2024    Chief Complaint   Patient presents with     Right Hand - Pain, Follow Up     Jessica Scott is a 28 year old female who is seen in f/u up for left. Since last visit on 12/15/23 patient has been using a thumb splint.  - Now ~ 1 months from initial onset    Worsened by: grasping, putting on gloves,   Better with: brace at times is helping  Treatments tried: rest/activity avoidance and casting/splinting/bracing  Associated symptoms:  stiff and throbbing pain, denies numbness and tingling in the hand.     PMH, Medications and Allergies were reviewed and updated as needed.    ROS:  As noted above otherwise negative.    Patient Active Problem List   Diagnosis     Episode of recurrent major depressive disorder, unspecified depression episode severity (H24)       Current Outpatient Medications   Medication Sig Dispense Refill     citalopram (CELEXA) 10 MG tablet TAKE 1 TABLET (10 MG) BY MOUTH DAILY. 90 tablet 0     drospirenone-ethinyl estradiol (VESTURA) 3-0.02 MG tablet TAKE 1 TABLET BY MOUTH EVERY DAY 84 tablet 0     metFORMIN (GLUCOPHAGE) 500 MG tablet Take 1 tablet (500 mg) by mouth daily (with breakfast) 90 tablet 3         OBJECTIVE:  /70   Wt 79.4 kg (175 lb)   BMI 31.00 kg/m     General: healthy, alert and in no distress  Skin: no suspicious lesions or rash.  CV: distal perfusion intact distal thumb  Resp: normal respiratory effort without conversational dyspnea   Psych: normal mood and affect  Gait: NORMAL  Neuro: Normal light sensory exam of distal thumb extremity     LEFT HAND  Inspection:    No swelling, bruising, discoloration, or obvious deformity or asymmetry  Palpation:  Tenderness APL EPB scaphoid MCP.   Range of Motion:    Full active flexion and extension at MCP, PIP, and DIP joints; normal finger cascade without malrotation.  Wrist pronation, supination, and ulnar/radial deviation normal.  But painful  Strength:     full  Special Tests:    Positive: UCL laxity bilaterally  No  crepitus CMC MCP thumb      RADIOLOGY:  Final results and radiologist's interpretation, available in the [facility] health record.  Images were reviewed with the patient in the office today.  Previously reviewed imaging and agree with interpretation as below:   Narrative & Impression   EXAM: XR HAND LEFT G/E 3 VIEWS  LOCATION: [facility]  DATE: 12/15/2023     INDICATION:  Injury of left hand, initial encounter  COMPARISON: None.                                                                      IMPRESSION: Normal joint spaces and alignment. No fracture.                      Again, thank you for allowing me to participate in the care of your patient.        Sincerely,        Louisa Cadena MD

## 2024-01-18 NOTE — PATIENT INSTRUCTIONS
1. Sprain of metacarpophalangeal (MCP) joint of left thumb, initial encounter    2. Injury of finger of left hand, subsequent encounter      - Medications NSAIDs: mobic 15mg daily for 30 days. Take with food. Sent to pharmacy.   - MRI. Call to schedule  -Continue to wear brace  -Follow-up after MRI    Please call [phone]  Ask for my team if you have any questions or concerns    Louisa Cadena DO  [facility]      Thank you for choosing [facility]!    CLINIC LOCATIONS:     [place]  TRIAGE LINE: [phone]   [address] [place] [address] APPOINTMENTS: [phone]   [place] [address] RADIOLOGY: [phone]   (Thursday & Friday) PHYSICAL THERAPY: [phone]    BILLING QUESTIONS: [phone]   [place] FAX: [phone] [address]    [place] [address]    (Monday & Wednesday

## 2024-01-18 NOTE — COMMUNITY RESOURCES LIST (ENGLISH)
01/18/2024   [facility]  N/A  For questions about this resource list or additional care needs, please contact your primary care clinic or care manager.  Phone: [phone]   Email: N/A   Address: [facility] [place] [address]   Hours: N/A        Hotlines and Helplines       Hotline - Housing crisis  1  [facility] (Main Office) Distance: 3.88 miles      Phone/Virtual   [address] [place] [address]  Language: English  Hours: Mon - Sun Open 24 Hours   Phone: (728) 660-7630 Email: info@[facility] Website: http://[facility].org     2  [facility] Distance: 4.27 miles      Phone/Virtual   [address] [place] [address]  Language: English  Hours: Mon - Sun Open 24 Hours   Phone: (449) 446-4808 Email: communications@[facility].org Website: https://[facility]-[place].org/oursaviourshousing/          Housing       Coordinated Entry access point  3  [facility] [employer] [facility] [place] ([facility] - Housing Services Distance: 4.26 miles      In-Person   [address] [place] [address]  Language: English  Hours: Mon - Fri 9:00 AM - 5:00 PM  Fees: Free   Phone: (193) 920-8722 Email: housing@[facility].org Website: http://www.[facility].org/housing     4  [facility] - Adult group home [facility] Distance: 5.31 miles      In-Person   [address] [place] [address]  Language: English  Hours: Mon - Sat 10:00 PM - 5:00 PM  Fees: Free   Phone: (384) 258-5593 Email: info@[facility].org Website: http://www.[facility].org/     Drop-in center or day shelter  5  [facility] Distance: 4.66 miles      In-Person   [address] [place] [address]  Language: English, Liberian, Finnish  Hours: Mon - Sat 7:00 AM - 3:00 PM  Fees: Free, Self Pay   Phone: (367) 158-9017 Email: info@[facility].org Website: https://www.[facility].org/locations/opportunity-center/     6  [facility] Distance: 4.67 miles      In-Person   [address] [place] [address]  Language: English  Hours: Mon - Fri 12:00 PM - 3:00 PM  Fees: Free   Phone: (959) 173-5857 Email: nikolas[employer] Website: http://[employer]/     Housing search assistance  7  [facility] - Office of Multicultural Services Distance: 3.05 miles      Phone/Virtual   [address] [place] [address]  Language: American Sign Language, Welsh, Malay, English, Chinese, John, Oromo, Cymraes, Greenlandic, Japanese, Swahili, [place] Islander, Turkish  Hours: Mon - Tue 9:00 AM - 4:00 PM , Wed 10:00 AM - 5:00 PM , Thu - Fri 9:00 AM - 4:00 PM  Fees: Free   Phone: (106) 507-5050 Email: oms@[facility] Website: http://www.[facility]/residents/human-services/multi-cultural-services     8  [facility] - Online Housing Search Assistance Distance: 3.63 miles      Phone/Virtual   [address] [place] [address]  Language: English  Hours: Mon - Sun Open 24 Hours  Fees: Free   Phone: (659) 129-8062 Email: findhoudaniela@[facility].[employer] Website: https://[employer]reezeplay/     Shelter for families  9  [facility] Distance: 20.58 miles      In-Person   [address] [place] [address]  Language: English  Hours: Mon - Fri 3:00 PM - 9:00 AM , Sat - Sun Open 24 Hours  Fees: Free   Phone: (123) 731-4023 Ext.1 Website: https://www.[facility].org/2020/07/03/emergency-family-shelter/     Shelter for individuals  10  [facility] Distance: 4.27 miles      In-Person   [address] [place] [address]  Language: English  Hours: Mon - Sun Open 24 Hours  Fees: Free   Phone: (702) 978-2557 Email: communications@[facility].org Website: https://[facility]-[place].org/oursaviourshousing/     11  [facility] Distance: 5.77 miles      In-Person   [address] [place] [address]  Language: English  Hours: Mon - Fri 4:00 PM - 9:00 AM   Fees: Free   Phone: (649) 925-8818 Email: rob@[facility].[employer].org Website: https://[facility].org/[facility][1]/[facility]/          Important Numbers & Websites       Emergency Services   911  [facility] Services   311  Poison Control   (604) 211-2060  Suicide Prevention Lifeline   (841) 166-5593 (TALK)  Child Abuse Hotline   (991) 876-2730 (4-A-Child)  Sexual Assault Hotline   (632) 973-9908 (HOPE)  National Runaway Safeline   (395) 405-6984 (RUNAWAY)  All-Options Talkline   (453) 496-5115  Substance Abuse Referral   (186) 749-1121 (HELP)

## 2024-01-23 ASSESSMENT — PATIENT HEALTH QUESTIONNAIRE - PHQ9
10. IF YOU CHECKED OFF ANY PROBLEMS, HOW DIFFICULT HAVE THESE PROBLEMS MADE IT FOR YOU TO DO YOUR WORK, TAKE CARE OF THINGS AT HOME, OR GET ALONG WITH OTHER PEOPLE: SOMEWHAT DIFFICULT
SUM OF ALL RESPONSES TO PHQ QUESTIONS 1-9: 4
SUM OF ALL RESPONSES TO PHQ QUESTIONS 1-9: 4

## 2024-01-24 ENCOUNTER — VIRTUAL VISIT (OUTPATIENT)
Dept: FAMILY MEDICINE | Facility: CLINIC | Age: 29
End: 2024-01-24
Payer: COMMERCIAL

## 2024-01-24 DIAGNOSIS — F33.9 EPISODE OF RECURRENT MAJOR DEPRESSIVE DISORDER, UNSPECIFIED DEPRESSION EPISODE SEVERITY (H): ICD-10-CM

## 2024-01-24 DIAGNOSIS — Z13.220 LIPID SCREENING: ICD-10-CM

## 2024-01-24 DIAGNOSIS — E28.2 PCOS (POLYCYSTIC OVARIAN SYNDROME): ICD-10-CM

## 2024-01-24 PROCEDURE — 99214 OFFICE O/P EST MOD 30 MIN: CPT | Mod: 95 | Performed by: FAMILY MEDICINE

## 2024-01-24 PROCEDURE — 96127 BRIEF EMOTIONAL/BEHAV ASSMT: CPT | Mod: 95 | Performed by: FAMILY MEDICINE

## 2024-01-24 RX ORDER — DROSPIRENONE AND ETHINYL ESTRADIOL 0.02-3(28)
1 KIT ORAL DAILY
Qty: 84 TABLET | Refills: 3 | Status: SHIPPED | OUTPATIENT
Start: 2024-01-24

## 2024-01-24 RX ORDER — CITALOPRAM HYDROBROMIDE 10 MG/1
10 TABLET ORAL DAILY
Qty: 90 TABLET | Refills: 3 | Status: SHIPPED | OUTPATIENT
Start: 2024-01-24

## 2024-01-24 NOTE — PROGRESS NOTES
Collette is a 28 year old who is being evaluated via a billable video visit.      How would you like to obtain your AVS? MyChart  If the video visit is dropped, the invitation should be resent by: Text to cell phone: [phone]  Will anyone else be joining your video visit? No          Assessment & Plan     Episode of recurrent major depressive disorder, unspecified depression episode severity (H24)      3/29/2022     7:22 AM 1/2/2023     2:51 PM 1/23/2024     9:39 AM   PHQ   PHQ-9 Total Score 4 6 4   Q9: Thoughts of better off dead/self-harm past 2 weeks Not at all Not at all Not at all    - stable, refill below   - citalopram (CELEXA) 10 MG tablet; Take 1 tablet (10 mg) by mouth daily  Dispense: 90 tablet; Refill: 3  - CBC with platelets; Future  - Comprehensive metabolic panel (BMP + Alb, Alk Phos, ALT, AST, Total. Bili, TP); Future  - Vitamin D Deficiency; Future    PCOS (polycystic ovarian syndrome)  - refilled, ordered below labs for further evaluation   - metFORMIN (GLUCOPHAGE) 500 MG tablet; Take 1 tablet (500 mg) by mouth daily (with breakfast)  Dispense: 90 tablet; Refill: 3  - drospirenone-ethinyl estradiol (VESTURA) 3-0.02 MG tablet; Take 1 tablet by mouth daily  Dispense: 84 tablet; Refill: 3  - Hemoglobin A1c; Future  - Vitamin B12; Future  - CBC with platelets; Future  - Comprehensive metabolic panel (BMP + Alb, Alk Phos, ALT, AST, Total. Bili, TP); Future    Lipid screening  - Lipid panel reflex to direct LDL Fasting; Future     Subjective   Collette is a 28 year old, presenting for the following health issues:  Recheck Medication      1/24/2024     4:39 PM   Additional Questions   Roomed by Tia     History of Present Illness       Mental Health Follow-up:  Patient presents to follow-up on Depression & Anxiety.Patient's depression since last visit has been:  Good  The patient is not having other symptoms associated with depression.  Patient's anxiety since last visit has been:  Better  The patient  "is not having other symptoms associated with anxiety.  Any significant life events: housing concerns, grief or loss and other  Patient is feeling anxious or having panic attacks.  Patient has no concerns about alcohol or drug use.    She eats 4 or more servings of fruits and vegetables daily.She consumes 0 sweetened beverage(s) daily.She exercises with enough effort to increase her heart rate 60 or more minutes per day.  She exercises with enough effort to increase her heart rate 4 days per week.   She is taking medications regularly.                   Objective    Vitals - Patient Reported  Weight (Patient Reported): 79.4 kg (175 lb)  Height (Patient Reported): 160 cm (5' 3\")  BMI (Based on Pt Reported Ht/Wt): 31        Physical Exam   GENERAL: alert and no distress  EYES: Eyes grossly normal to inspection.  No discharge or erythema, or obvious scleral/conjunctival abnormalities.  RESP: No audible wheeze, cough, or visible cyanosis.    SKIN: Visible skin clear. No significant rash, abnormal pigmentation or lesions.  NEURO: Cranial nerves grossly intact.  Mentation and speech appropriate for age.  PSYCH: Appropriate affect, tone, and pace of words          Video-Visit Details    Type of service:  Video Visit     Originating Location (pt. Location): Home    Distant Location (provider location):  On-site  Platform used for Video Visit: González  Signed Electronically by: Emily Guo MD    "

## 2024-01-26 ENCOUNTER — MYC MEDICAL ADVICE (OUTPATIENT)
Dept: FAMILY MEDICINE | Facility: CLINIC | Age: 29
End: 2024-01-26
Payer: COMMERCIAL

## 2024-01-27 ENCOUNTER — HOSPITAL ENCOUNTER (OUTPATIENT)
Dept: MRI IMAGING | Facility: HOSPITAL | Age: 29
Discharge: HOME | End: 2024-01-27
Attending: STUDENT IN AN ORGANIZED HEALTH CARE EDUCATION/TRAINING PROGRAM | Admitting: STUDENT IN AN ORGANIZED HEALTH CARE EDUCATION/TRAINING PROGRAM
Payer: COMMERCIAL

## 2024-01-27 DIAGNOSIS — S69.92XD INJURY OF FINGER OF LEFT HAND, SUBSEQUENT ENCOUNTER: ICD-10-CM

## 2024-01-27 DIAGNOSIS — S63.642A SPRAIN OF METACARPOPHALANGEAL (MCP) JOINT OF LEFT THUMB, INITIAL ENCOUNTER: ICD-10-CM

## 2024-01-27 PROCEDURE — 73218 MRI UPPER EXTREMITY W/O DYE: CPT | Mod: LT

## 2024-01-29 ENCOUNTER — TELEPHONE (OUTPATIENT)
Dept: ORTHOPEDICS | Facility: CLINIC | Age: 29
End: 2024-01-29
Payer: COMMERCIAL

## 2024-01-31 NOTE — PROGRESS NOTES
ASSESSMENT & PLAN    Collette was seen today for follow up.    Diagnoses and all orders for this visit:    Sprain of ulnar collateral ligament of metacarpophalangeal (MCP) joint of thumb, subsequent encounter  -     Hand Therapy Referral; Future        MRI dated 1/27/2024 follow-up.  Showed subacute sprain of the MCP without evidence of a complete tear or Stener lesion.  2.5 months out from initial injury.  At this time she has worn brace at work however will recommend wearing for additional 6 weeks with all exacerbating activities and starting hand therapy to help strengthen hyperlaxity of bilateral thumbs specially the left.  Follow-up in 6 weeks.  Tylenol and ibuprofen as needed for pain as Mobic did not provide more significant relief.    Follow-up 6 weeks status post hand therapy    Return sooner if develops new or worsening symptoms.    Options for treatment and/or follow-up care were reviewed with the patient was actively involved in the decision making process. Patient verbalized understanding and was in agreement with the plan.    >30 minutes spent on the date of the encounter doing chart reivew, history and exam, documentation and further activities as noted above with the exception of procedures.    Louisa Cadena [facility]    SUBJECTIVE-February 3, 2024    Chief Complaint   Patient presents with    Left Thumb - Follow Up       Collette Scott is a 28 year old female who is seen in f/u up for Left thumb Sprain. Since last visit on 1/18/24 patient has no improvement in pain and functionality. Pt notes that it continues to feel weak as she attempts to use it.   - Now ~ 2.5 from initial onset    Worsened by: gripping/grasping, pressure on the volar aspect of the thenar eminence  Better with: treatments tried  Treatments tried: rest/activity avoidance, other medications: Meloxicam, and casting/splinting/bracing  Associated symptoms:  weakness of the thumb  with use and feeling of instability  Denies numbness, tingling, locking.     PMH, Medications and Allergies were reviewed and updated as needed.    ROS:  As noted above otherwise negative.    Patient Active Problem List   Diagnosis    Episode of recurrent major depressive disorder, unspecified depression episode severity (H24)       Current Outpatient Medications   Medication Sig Dispense Refill    citalopram (CELEXA) 10 MG tablet Take 1 tablet (10 mg) by mouth daily 90 tablet 3    drospirenone-ethinyl estradiol (VESTURA) 3-0.02 MG tablet Take 1 tablet by mouth daily 84 tablet 3    meloxicam (MOBIC) 15 MG tablet Take 1 tablet (15 mg) by mouth daily 30 tablet 0    metFORMIN (GLUCOPHAGE) 500 MG tablet Take 1 tablet (500 mg) by mouth daily (with breakfast) 90 tablet 3         OBJECTIVE:  LMP  (LMP Unknown)    General: healthy, alert and in no distress  Skin: no suspicious lesions or rash.  CV: distal perfusion intact distal thumb  Resp: normal respiratory effort without conversational dyspnea   Psych: normal mood and affect  Gait: NORMAL  Neuro: Normal light sensory exam of distal thumb extremity     LEFT HAND  Inspection:    No swelling, bruising, discoloration, or obvious deformity or asymmetry  Palpation:  Tenderness APL EPB  Range of Motion:    Full active flexion and extension at MCP, PIP, and DIP joints; normal finger cascade without malrotation.  Wrist pronation, supination, and ulnar/radial deviation normal.  But painful  Strength:     full  Special Tests:    Positive: UCL laxity bilaterally  No crepitus CMC MCP thumb      RADIOLOGY:  Final results and radiologist's interpretation, available in the [facility] health record.  Images were reviewed with the patient in the office today.    Reviewed and agree with interpretation:   Narrative & Impression   EXAM: MR HAND LEFT W/O CONTRAST  LOCATION: [facility]  DATE: 1/27/2024     INDICATION: Sprain of metacarpophalangeal (MCP) joint of left  thumb, initial encounter, Injury of finger of left hand, subsequent encounter.  COMPARISON: 12/15/2023 radiographs.  TECHNIQUE: Unenhanced.     FINDINGS:      TENDONS:   -Extensor tendons: No tendon tear, tendinopathy, or tenosynovitis.  -Flexor tendons: No tear, tendinopathy, or tenosynovitis.     LIGAMENTS:  -Visualized collateral and capsular ligaments: Small focus of T2 signal abnormality in the ulnar collateral ligament of the MCP joint of the thumb may reflect a subacute sprain. No evidence of a complete tear. The radial collateral ligament is intact and   unremarkable. No evidence of a Stener lesion.     JOINTS AND BONES:   -No fracture or bone marrow edema. Normal articular cartilage. No joint effusion or synovitis.     MUSCLES AND SOFT TISSUES:   -No muscle atrophy or edema. No soft tissue mass or fluid collection.                                                                      IMPRESSION:  1.  Likely mild subacute sprain of the ulnar collateral ligament of the MCP joint of the thumb.     2.  No fracture or contusion.                  Disclaimer: This note consists of symbols derived from keyboarding, dictation and/or voice recognition software. As a result, there may be errors in the script that have gone undetected. Please consider this when interpreting information found in this chart.

## 2024-02-01 NOTE — TELEPHONE ENCOUNTER
"Dr. Guo-Please review and may close encounter.    \"I am going to do a trial run of no birth control to see if that helps.  If that doesn't seem to help, I will contact you again.   Thanks for your time and help! I appreciate it. \"    Thank you!  TJ BeauchampN, RN-BC  [facility]    "
Dr. Guo,    Patient wants to know if her weight gain could be due to hormonal birth control. Would you recommend an alternative? Happy to ask her to schedule an e-visit to discuss alternatives.    Sulema Cho RN, BSN, PHN  [facility]  [phone]    
Reviewed  DM  
No

## 2024-02-03 ENCOUNTER — OFFICE VISIT (OUTPATIENT)
Dept: ORTHOPEDICS | Facility: CLINIC | Age: 29
End: 2024-02-03
Payer: COMMERCIAL

## 2024-02-03 VITALS
DIASTOLIC BLOOD PRESSURE: 65 MMHG | SYSTOLIC BLOOD PRESSURE: 120 MMHG | BODY MASS INDEX: 31.01 KG/M2 | WEIGHT: 175 LBS | HEIGHT: 63 IN

## 2024-02-03 DIAGNOSIS — S63.649D: Primary | ICD-10-CM

## 2024-02-03 PROCEDURE — 99214 OFFICE O/P EST MOD 30 MIN: CPT | Performed by: STUDENT IN AN ORGANIZED HEALTH CARE EDUCATION/TRAINING PROGRAM

## 2024-02-03 NOTE — LETTER
2/3/2024         RE: Collette Scott  [address]  [place] [address]        Dear Colleague,    Thank you for referring your patient, Collette Scott, to the [facility]. Please see a copy of my visit note below.    ASSESSMENT & PLAN    Collette was seen today for follow up.    Diagnoses and all orders for this visit:    Sprain of ulnar collateral ligament of metacarpophalangeal (MCP) joint of thumb, subsequent encounter  -     Hand Therapy Referral; Future        MRI dated 1/27/2024 follow-up.  Showed subacute sprain of the MCP without evidence of a complete tear or Stener lesion.  2.5 months out from initial injury.  At this time she has worn brace at work however will recommend wearing for additional 6 weeks with all exacerbating activities and starting hand therapy to help strengthen hyperlaxity of bilateral thumbs specially the left.  Follow-up in 6 weeks.  Tylenol and ibuprofen as needed for pain as Mobic did not provide more significant relief.    Follow-up 6 weeks status post hand therapy    Return sooner if develops new or worsening symptoms.    Options for treatment and/or follow-up care were reviewed with the patient was actively involved in the decision making process. Patient verbalized understanding and was in agreement with the plan.    >30 minutes spent on the date of the encounter doing chart reivew, history and exam, documentation and further activities as noted above with the exception of procedures.    Louisa Cadena DO  [facility]    SUBJECTIVE-February 3, 2024    Chief Complaint   Patient presents with     Left Thumb - Follow Up       Collette Scott is a 28 year old female who is seen in f/u up for Left thumb Sprain. Since last visit on 1/18/24 patient has no improvement in pain and functionality. Pt notes that it continues to feel weak as she attempts to use it.   - Now ~ 2.5 from  initial onset    Worsened by: gripping/grasping, pressure on the volar aspect of the thenar eminence  Better with: treatments tried  Treatments tried: rest/activity avoidance, other medications: Meloxicam, and casting/splinting/bracing  Associated symptoms:  weakness of the thumb with use and feeling of instability  Denies numbness, tingling, locking.     PMH, Medications and Allergies were reviewed and updated as needed.    ROS:  As noted above otherwise negative.    Patient Active Problem List   Diagnosis     Episode of recurrent major depressive disorder, unspecified depression episode severity (H24)       Current Outpatient Medications   Medication Sig Dispense Refill     citalopram (CELEXA) 10 MG tablet Take 1 tablet (10 mg) by mouth daily 90 tablet 3     drospirenone-ethinyl estradiol (VESTURA) 3-0.02 MG tablet Take 1 tablet by mouth daily 84 tablet 3     meloxicam (MOBIC) 15 MG tablet Take 1 tablet (15 mg) by mouth daily 30 tablet 0     metFORMIN (GLUCOPHAGE) 500 MG tablet Take 1 tablet (500 mg) by mouth daily (with breakfast) 90 tablet 3         OBJECTIVE:  LMP  (LMP Unknown)    General: healthy, alert and in no distress  Skin: no suspicious lesions or rash.  CV: distal perfusion intact distal thumb  Resp: normal respiratory effort without conversational dyspnea   Psych: normal mood and affect  Gait: NORMAL  Neuro: Normal light sensory exam of distal thumb extremity     LEFT HAND  Inspection:    No swelling, bruising, discoloration, or obvious deformity or asymmetry  Palpation:  Tenderness APL EPB  Range of Motion:    Full active flexion and extension at MCP, PIP, and DIP joints; normal finger cascade without malrotation.  Wrist pronation, supination, and ulnar/radial deviation normal.  But painful  Strength:     full  Special Tests:    Positive: UCL laxity bilaterally  No crepitus CMC MCP thumb      RADIOLOGY:  Final results and radiologist's interpretation, available in the [facility] health record.  Images  were reviewed with the patient in the office today.    Reviewed and agree with interpretation:   Narrative & Impression   EXAM: MR HAND LEFT W/O CONTRAST  LOCATION: [facility]  DATE: 1/27/2024     INDICATION: Sprain of metacarpophalangeal (MCP) joint of left thumb, initial encounter, Injury of finger of left hand, subsequent encounter.  COMPARISON: 12/15/2023 radiographs.  TECHNIQUE: Unenhanced.     FINDINGS:      TENDONS:   -Extensor tendons: No tendon tear, tendinopathy, or tenosynovitis.  -Flexor tendons: No tear, tendinopathy, or tenosynovitis.     LIGAMENTS:  -Visualized collateral and capsular ligaments: Small focus of T2 signal abnormality in the ulnar collateral ligament of the MCP joint of the thumb may reflect a subacute sprain. No evidence of a complete tear. The radial collateral ligament is intact and   unremarkable. No evidence of a Stener lesion.     JOINTS AND BONES:   -No fracture or bone marrow edema. Normal articular cartilage. No joint effusion or synovitis.     MUSCLES AND SOFT TISSUES:   -No muscle atrophy or edema. No soft tissue mass or fluid collection.                                                                      IMPRESSION:  1.  Likely mild subacute sprain of the ulnar collateral ligament of the MCP joint of the thumb.     2.  No fracture or contusion.                  Disclaimer: This note consists of symbols derived from keyboarding, dictation and/or voice recognition software. As a result, there may be errors in the script that have gone undetected. Please consider this when interpreting information found in this chart.        Again, thank you for allowing me to participate in the care of your patient.        Sincerely,        Louisa Cadena MD

## 2024-02-03 NOTE — PATIENT INSTRUCTIONS
1. Sprain of ulnar collateral ligament of metacarpophalangeal (MCP) joint of thumb, subsequent encounter        - UCL sprain wear brace for 6 weeks  -Hand therapy. Call to schedule.     Please call [phone]  Ask for my team if you have any questions or concerns    Louisa Cadena DO  [facility]      Thank you for choosing [facility]!    CLINIC LOCATIONS:     [place]  TRIAGE LINE: [phone]   [address] [place] [address] APPOINTMENTS: [phone]   [place] [address] RADIOLOGY: [phone]   (Thursday & Friday) PHYSICAL THERAPY: [phone]    BILLING QUESTIONS: [phone]   [place] FAX: [phone] [address]    [place] [address]    (Monday & Wednesday

## 2024-02-23 ENCOUNTER — THERAPY VISIT (OUTPATIENT)
Dept: OCCUPATIONAL THERAPY | Facility: CLINIC | Age: 29
End: 2024-02-23
Attending: STUDENT IN AN ORGANIZED HEALTH CARE EDUCATION/TRAINING PROGRAM
Payer: COMMERCIAL

## 2024-02-23 DIAGNOSIS — S63.649D: ICD-10-CM

## 2024-02-23 DIAGNOSIS — M79.645 PAIN OF LEFT THUMB: Primary | ICD-10-CM

## 2024-02-23 PROCEDURE — 97165 OT EVAL LOW COMPLEX 30 MIN: CPT | Mod: GO | Performed by: OCCUPATIONAL THERAPIST

## 2024-02-23 PROCEDURE — 97110 THERAPEUTIC EXERCISES: CPT | Mod: GO | Performed by: OCCUPATIONAL THERAPIST

## 2024-02-23 PROCEDURE — 97760 ORTHOTIC MGMT&TRAING 1ST ENC: CPT | Mod: GO | Performed by: OCCUPATIONAL THERAPIST

## 2024-02-23 NOTE — PROGRESS NOTES
OCCUPATIONAL THERAPY EVALUATION  Type of Visit: Evaluation    See electronic medical record for Abuse and Falls Screening details.    Subjective      Presenting condition or subjective complaint: sprained thumb  Date of onset: 11/13/23    Relevant medical history: Depression; Overweight   Dates & types of surgery: tubes in ears, tonsil removal 2003, ganglion cyst on wrist remval 2009, wisdom teeth removal 2011    Prior diagnostic imaging/testing results: MRI; X-ray     Prior therapy history for the same diagnosis, illness or injury:        Prior Level of Function  Transfers: Independent  Ambulation: Independent  ADL: Independent  IADL:  IND    Living Environment  Social support: With a significant other or spouse   Type of home: House   Stairs to enter the home: Yes       Ramp: No   Stairs inside the home: Yes 10 Is there a railing: Yes   Help at home: None  Equipment owned:       Employment: Yes [employer]  Hobbies/Interests: camping, hiking, reading, working out, walking    Patient goals for therapy: lifting, writing, everyday activities w/out pain    Pain assessment: Pain present     Objective   ADDITIONAL HISTORY:  Left hand dominant  Patient reports symptoms of pain, stiffness/loss of motion, and weakness/loss of strength  Transportation: drives  Currently working in normal job without restrictions    Functional Outcome Measure:   Upper Extremity Functional Index Score:  SCORE:   Column Totals: /80: 70   (A lower score indicates greater disability.)       PAIN:  Pain Level at Rest: 0/10  Pain Level with Use: 3/10  Pain Location: wrist, thumb, and pain over radial/dorsal thumb at MP and CMC joint  Pain Quality: Aching, Dull, Sharp, and Tender  Pain Frequency: intermittent  Pain is Worst: daytime or nighttime  Pain is Exacerbated By: gripping and use  Pain is Relieved By: rest and wearing brace  Pain Progression: Improved    EDEMA: None     SCAR/WOUND:  None    SENSATION: WNL throughout all nerve  "distributions; per patient report     ROM:   Thumb ROM  Left AROM Right AROM    MP Joint 59 60   IP Joint  86 40   Radial Abduction 70 67   Palmar Abduction 60 85   Retropulsion (cm) 6.1 7.4   K[place]andji Opposition Scale (0-10/10) 10/10 10/10     OBSERVATIONS/APPEARANCE:  Right:Normal to mild hypermobility of UCL Left: Mild to moderate hypermobility of UCL      RESISTED TESTING:  Deferred      STRENGTH:     Measured in pounds 2/23/2024 2/23/2024    Left Right   Trial 1 63 +\"pain after gripping\" 64     Lateral Pinch  Measured in pounds 2/23/2024 2/23/2024    Left Right   Trial 1 14 18     3 Point Pinch  Measured in pounds 2/23/2024 2/23/2024    Left Right   Trial 1 16 16       PALPATION:   Thumb Palpation    Thumb CMC Joint -   Thenar [place] -   Web Space -   1st Dorsal Compartment -   Radial Styloid -   MP Joint  Radial: -Ulnar:-   FCR Insertion -         Assessment & Plan   CLINICAL IMPRESSIONS  Medical Diagnosis: L Thumb UCL Sprain    Treatment Diagnosis: L Thumb Pain    Impression/Assessment: Pt is a 28 year old female presenting to Occupational Therapy due to L thumb pain.  The following significant findings have been identified: Impaired strength and Pain.  These identified deficits interfere with their ability to perform work tasks, recreational activities, and household chores as compared to previous level of function.     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, meal preparation and cleanup, and work  Clinical Decision Making (Complexity): Low complexity    PLAN OF CARE  Treatment Interventions:  Modalities:  US, Paraffin, and TENS  Therapeutic Exercise:  AROM, AAROM, PROM, Tendon Gliding, Blocking, Reverse Blocking, Place and Hold, Contract Relax, Extensor Tracking, Isotonics, Isometrics, and Stabilization  Neuromuscular re-education:  Nerve Gliding, " Coordination/Dexterity, Sensory re-education, Desensitization, Proprioceptive Training, Kinesiotaping, Isometrics, and Stabilization  Manual Techniques:  Coordination/Dexterity, Joint mobilization, Scar mobilization, Friction massage, Myofascial release, and Manual edema mobilization  Orthotic Fabrication:  Static  Self Care:  Self Care Tasks, Ergonomic Considerations, and Work Tasks    Long Term Goals   OT Goal 1  Goal Identifier: Handwritting  Goal Description: Pt will complete 20 minutes of handwriting with 0/10 pain reported  Rationale: In order to maximize safety and independence with performance of self-care activities  Goal Progress: Initial  Target Date: 04/23/24      Frequency of Treatment: 4x a month  Duration of Treatment: 2 months     Recommended Referrals to Other Professionals:   Education Assessment: Learner/Method: Patient;Demonstration;Pictures/Video;No Barriers to Learning     Risks and benefits of evaluation/treatment have been explained.   Patient/Family/caregiver agrees with Plan of Care.     Evaluation Time:    OT Eval, Low Complexity Minutes (85098): 20       Signing Clinician: TY Arredondo

## 2024-02-25 ENCOUNTER — HEALTH MAINTENANCE LETTER (OUTPATIENT)
Age: 29
End: 2024-02-25

## 2024-02-27 DIAGNOSIS — E28.2 PCOS (POLYCYSTIC OVARIAN SYNDROME): ICD-10-CM

## 2024-03-01 ENCOUNTER — THERAPY VISIT (OUTPATIENT)
Dept: OCCUPATIONAL THERAPY | Facility: CLINIC | Age: 29
End: 2024-03-01
Payer: COMMERCIAL

## 2024-03-01 DIAGNOSIS — M79.645 PAIN OF LEFT THUMB: Primary | ICD-10-CM

## 2024-03-01 PROCEDURE — 97110 THERAPEUTIC EXERCISES: CPT | Mod: GO | Performed by: OCCUPATIONAL THERAPIST

## 2024-03-01 PROCEDURE — 97018 PARAFFIN BATH THERAPY: CPT | Mod: GO | Performed by: OCCUPATIONAL THERAPIST

## 2024-03-02 ENCOUNTER — LAB (OUTPATIENT)
Dept: LAB | Facility: CLINIC | Age: 29
End: 2024-03-02
Payer: COMMERCIAL

## 2024-03-02 DIAGNOSIS — F33.9 EPISODE OF RECURRENT MAJOR DEPRESSIVE DISORDER, UNSPECIFIED DEPRESSION EPISODE SEVERITY (H): ICD-10-CM

## 2024-03-02 DIAGNOSIS — Z13.220 LIPID SCREENING: ICD-10-CM

## 2024-03-02 DIAGNOSIS — E28.2 PCOS (POLYCYSTIC OVARIAN SYNDROME): ICD-10-CM

## 2024-03-02 LAB
ERYTHROCYTE [DISTWIDTH] IN BLOOD BY AUTOMATED COUNT: 12.1 % (ref 10–15)
HBA1C MFR BLD: 5 % (ref 0–5.6)
HCT VFR BLD AUTO: 38.5 % (ref 35–47)
HGB BLD-MCNC: 12.9 G/DL (ref 11.7–15.7)
MCH RBC QN AUTO: 29.3 PG (ref 26.5–33)
MCHC RBC AUTO-ENTMCNC: 33.5 G/DL (ref 31.5–36.5)
MCV RBC AUTO: 88 FL (ref 78–100)
PLATELET # BLD AUTO: 337 10E3/UL (ref 150–450)
RBC # BLD AUTO: 4.4 10E6/UL (ref 3.8–5.2)
WBC # BLD AUTO: 5.7 10E3/UL (ref 4–11)

## 2024-03-02 PROCEDURE — 83036 HEMOGLOBIN GLYCOSYLATED A1C: CPT

## 2024-03-02 PROCEDURE — 82607 VITAMIN B-12: CPT

## 2024-03-02 PROCEDURE — 80061 LIPID PANEL: CPT

## 2024-03-02 PROCEDURE — 80053 COMPREHEN METABOLIC PANEL: CPT

## 2024-03-02 PROCEDURE — 85027 COMPLETE CBC AUTOMATED: CPT

## 2024-03-02 PROCEDURE — 36415 COLL VENOUS BLD VENIPUNCTURE: CPT

## 2024-03-02 PROCEDURE — 82306 VITAMIN D 25 HYDROXY: CPT

## 2024-03-03 LAB
ALBUMIN SERPL BCG-MCNC: 4.3 G/DL (ref 3.5–5.2)
ALP SERPL-CCNC: 59 U/L (ref 40–150)
ALT SERPL W P-5'-P-CCNC: 50 U/L (ref 0–50)
ANION GAP SERPL CALCULATED.3IONS-SCNC: 10 MMOL/L (ref 7–15)
AST SERPL W P-5'-P-CCNC: 33 U/L (ref 0–45)
BILIRUB SERPL-MCNC: 0.4 MG/DL
BUN SERPL-MCNC: 11.6 MG/DL (ref 6–20)
CALCIUM SERPL-MCNC: 8.8 MG/DL (ref 8.6–10)
CHLORIDE SERPL-SCNC: 106 MMOL/L (ref 98–107)
CHOLEST SERPL-MCNC: 231 MG/DL
CREAT SERPL-MCNC: 0.8 MG/DL (ref 0.51–0.95)
DEPRECATED HCO3 PLAS-SCNC: 26 MMOL/L (ref 22–29)
EGFRCR SERPLBLD CKD-EPI 2021: >90 ML/MIN/1.73M2
FASTING STATUS PATIENT QL REPORTED: YES
GLUCOSE SERPL-MCNC: 94 MG/DL (ref 70–99)
HDLC SERPL-MCNC: 59 MG/DL
LDLC SERPL CALC-MCNC: 158 MG/DL
NONHDLC SERPL-MCNC: 172 MG/DL
POTASSIUM SERPL-SCNC: 4.2 MMOL/L (ref 3.4–5.3)
PROT SERPL-MCNC: 6.6 G/DL (ref 6.4–8.3)
SODIUM SERPL-SCNC: 142 MMOL/L (ref 135–145)
TRIGL SERPL-MCNC: 71 MG/DL
VIT B12 SERPL-MCNC: 435 PG/ML (ref 232–1245)
VIT D+METAB SERPL-MCNC: 23 NG/ML (ref 20–50)

## 2024-03-15 ENCOUNTER — THERAPY VISIT (OUTPATIENT)
Dept: OCCUPATIONAL THERAPY | Facility: CLINIC | Age: 29
End: 2024-03-15
Payer: COMMERCIAL

## 2024-03-15 DIAGNOSIS — M79.645 PAIN OF LEFT THUMB: Primary | ICD-10-CM

## 2024-03-15 PROCEDURE — 97035 APP MDLTY 1+ULTRASOUND EA 15: CPT | Mod: GO | Performed by: OCCUPATIONAL THERAPIST

## 2024-03-15 PROCEDURE — 97110 THERAPEUTIC EXERCISES: CPT | Mod: GO | Performed by: OCCUPATIONAL THERAPIST

## 2024-03-29 ENCOUNTER — THERAPY VISIT (OUTPATIENT)
Dept: OCCUPATIONAL THERAPY | Facility: CLINIC | Age: 29
End: 2024-03-29
Payer: COMMERCIAL

## 2024-03-29 DIAGNOSIS — M79.645 PAIN OF LEFT THUMB: Primary | ICD-10-CM

## 2024-03-29 PROCEDURE — 97110 THERAPEUTIC EXERCISES: CPT | Mod: GO | Performed by: OCCUPATIONAL THERAPIST

## 2024-04-19 ENCOUNTER — THERAPY VISIT (OUTPATIENT)
Dept: OCCUPATIONAL THERAPY | Facility: CLINIC | Age: 29
End: 2024-04-19
Payer: COMMERCIAL

## 2024-04-19 DIAGNOSIS — M79.645 PAIN OF LEFT THUMB: Primary | ICD-10-CM

## 2024-04-19 PROCEDURE — 97110 THERAPEUTIC EXERCISES: CPT | Mod: GO | Performed by: OCCUPATIONAL THERAPIST

## 2024-04-19 PROCEDURE — 97018 PARAFFIN BATH THERAPY: CPT | Mod: GO | Performed by: OCCUPATIONAL THERAPIST

## 2024-04-19 NOTE — PROGRESS NOTES
04/19/24 0500   Appointment Info   Treating Provider GARRET Parker OTR/BRIA   Total/Authorized Visits 8 POC   Visits Used 5   Medical Diagnosis L Thumb UCL Sprain   OT Tx Diagnosis L Thumb Pain   Progress Note/Certification   Onset of Illness/Injury or Date of Surgery 11/13/23   Therapy Frequency 4x a month   Predicted Duration 2 months   Progress Note Due Date 02/23/24   Progress Note Completed Date 04/23/24   OT Goal 1   Goal Identifier Handwritting   Goal Description Pt will complete 20 minutes of handwriting with 0/10 pain reported   Rationale In order to maximize safety and independence with performance of self-care activities   Goal Progress Met   Target Date 04/23/24   Date Met 04/19/24   Subjective Report   Subjective Report Pt reported thumb seems better the strength is there and stable, having sore over APB and metacarpal with movements   Objective Measures   Objective Measures Hand Obj Measures;Objective Measure 1;Objective Measure 2   Hand Objective Measures Strength   Strength ;Lateral Pinch;3 Point Pinch   Objective Measure 1   Objective Measure Pain   Details at rest 0/10 with use 1/10 and fatiuge noted   Objective Measure 2   Objective Measure Joint Mobility   Details no hypermobility or pain with UCL stress    (measured in pounds)    Average Strength 77   Lateral Pinch (measured in pounds)   Lateral Pinch Average Strength 14   3 Point Pinch (measured in pounds)   3 Point Pinch Average Strength 12   OT Modalities   OT Modalities Paraffin Bath    Paraffin Bath   Paraffin Minutes (86158) 8 Minutes   Treatment Detail R hand prior to tx   Patient Response/Progress tolerated well   Ultrasound   Treatment Detail Left Thumb over MCPj and UCL   Ultrasound -Type (does not include 3-5 min prep/cleanup time) Pulsed 20%;2 cm sound head   Intensity 1.0   Frequency 3 MHz   Location Left Thumb over MCPj and UCL   Positioning sitting   Patient Response/Progress tolerated well   Treatment Interventions (OT)    Interventions Therapeutic Procedure/Exercise;Orthotics;Self Care/Home Management   Self Care/Home Management   Self Care 1 KT Tape   Self Care 1 - Details CMC dynamic taping with MP support walked through with pt to complete for weight lifting, pt will no wean from orhtotic during heavy activities.   Therapeutic Procedure/Exercise   Therapeutic Procedure: strength, endurance, ROM, flexibillity minutes (68966) 20   Therapeutic Procedures Ther Proc 2;Ther Proc 3;Ther Proc 4;Ther Proc 5;Ther Proc 6;Ther Proc 7   Ther Proc 1 Thumb Stabilization, Repositioning, Method 1(on chest)   Ther Proc 1 - Details HEP 1 min holds   Ther Proc 2 Thumb Stabilization Web Space Release Method 1 with Clip   Ther Proc 2 - Details 1-2 min holds HEP   Ther Proc 3 Thumb Active Range of Motion Circumduction   Ther Proc 3 - Details 1x15 HEP   Ther Proc 4 Thumb webspace stretch at edge of table   Ther Proc 4 - Details 1x15 HEP   Ther Proc 5 [place] Massage to Thumb   Ther Proc 5 - Details 1-3 mins   Skilled Intervention Verbal and visual cues   Patient Response/Progress tolerated well; upgraded with strengthening today   Orthotics   Type of Orthotic Hand based thumb spica with MP stability   Wear Schedule weaned out on this date   Skilled Intervention Adjustment and new no stretch strap   Patient Response/Progress tolerated well reported improved stavbility   Education   Learner/Method Patient;Demonstration;Pictures/Video;No Barriers to Learning   Plan   Home program Updated   Updates to plan of care None   Plan for next session D/c   Total Session Time   Timed Code Treatment Minutes 20   Total Treatment Time (sum of timed and untimed services) 28         DISCHARGE  Reason for Discharge: Patient has met all goals.    Equipment Issued: None     Discharge Plan: Patient to continue home program.    Referring Provider:  Louisa Cadena

## 2024-11-04 ASSESSMENT — ANXIETY QUESTIONNAIRES: GAD7 TOTAL SCORE: 4

## 2025-02-14 ENCOUNTER — MYC MEDICAL ADVICE (OUTPATIENT)
Dept: FAMILY MEDICINE | Facility: CLINIC | Age: 30
End: 2025-02-14
Payer: COMMERCIAL

## 2025-02-19 NOTE — TELEPHONE ENCOUNTER
Writer replied to patient via [employer].  SANDEEP Diana BSN, PHN, AMB-BC (she/her)  [facility] RN

## 2025-02-19 NOTE — TELEPHONE ENCOUNTER
Writer responded via [employer].  SANDEEP Santiago, BSN, PHN, RN-[facility]  [phone]

## 2025-02-28 DIAGNOSIS — E28.2 PCOS (POLYCYSTIC OVARIAN SYNDROME): ICD-10-CM

## 2025-03-15 ENCOUNTER — HEALTH MAINTENANCE LETTER (OUTPATIENT)
Age: 30
End: 2025-03-15

## 2025-05-20 SDOH — HEALTH STABILITY: PHYSICAL HEALTH: ON AVERAGE, HOW MANY DAYS PER WEEK DO YOU ENGAGE IN MODERATE TO STRENUOUS EXERCISE (LIKE A BRISK WALK)?: 4 DAYS

## 2025-05-20 SDOH — HEALTH STABILITY: PHYSICAL HEALTH: ON AVERAGE, HOW MANY MINUTES DO YOU ENGAGE IN EXERCISE AT THIS LEVEL?: 20 MIN

## 2025-05-20 ASSESSMENT — PATIENT HEALTH QUESTIONNAIRE - PHQ9
10. IF YOU CHECKED OFF ANY PROBLEMS, HOW DIFFICULT HAVE THESE PROBLEMS MADE IT FOR YOU TO DO YOUR WORK, TAKE CARE OF THINGS AT HOME, OR GET ALONG WITH OTHER PEOPLE: SOMEWHAT DIFFICULT
SUM OF ALL RESPONSES TO PHQ QUESTIONS 1-9: 7
SUM OF ALL RESPONSES TO PHQ QUESTIONS 1-9: 7

## 2025-05-20 ASSESSMENT — SOCIAL DETERMINANTS OF HEALTH (SDOH): HOW OFTEN DO YOU GET TOGETHER WITH FRIENDS OR RELATIVES?: ONCE A WEEK

## 2025-05-21 ENCOUNTER — OFFICE VISIT (OUTPATIENT)
Dept: FAMILY MEDICINE | Facility: CLINIC | Age: 30
End: 2025-05-21
Payer: COMMERCIAL

## 2025-05-21 VITALS
TEMPERATURE: 97.7 F | RESPIRATION RATE: 16 BRPM | OXYGEN SATURATION: 100 % | WEIGHT: 180 LBS | DIASTOLIC BLOOD PRESSURE: 74 MMHG | BODY MASS INDEX: 31.89 KG/M2 | HEART RATE: 61 BPM | SYSTOLIC BLOOD PRESSURE: 114 MMHG | HEIGHT: 63 IN

## 2025-05-21 DIAGNOSIS — E78.5 DYSLIPIDEMIA: ICD-10-CM

## 2025-05-21 DIAGNOSIS — E28.2 PCOS (POLYCYSTIC OVARIAN SYNDROME): ICD-10-CM

## 2025-05-21 DIAGNOSIS — Z00.00 ROUTINE GENERAL MEDICAL EXAMINATION AT A HEALTH CARE FACILITY: Primary | ICD-10-CM

## 2025-05-21 DIAGNOSIS — F33.9 EPISODE OF RECURRENT MAJOR DEPRESSIVE DISORDER, UNSPECIFIED DEPRESSION EPISODE SEVERITY: ICD-10-CM

## 2025-05-21 PROCEDURE — 99214 OFFICE O/P EST MOD 30 MIN: CPT | Mod: 25 | Performed by: FAMILY MEDICINE

## 2025-05-21 PROCEDURE — 99395 PREV VISIT EST AGE 18-39: CPT | Performed by: FAMILY MEDICINE

## 2025-05-21 RX ORDER — CITALOPRAM HYDROBROMIDE 10 MG/1
10 TABLET ORAL DAILY
Qty: 90 TABLET | Refills: 3 | Status: SHIPPED | OUTPATIENT
Start: 2025-05-21

## 2025-05-21 ASSESSMENT — PAIN SCALES - GENERAL: PAINLEVEL_OUTOF10: NO PAIN (0)

## 2025-05-21 NOTE — PATIENT INSTRUCTIONS
Patient Education   Preventive Care Advice   This is general advice given by our system to help you stay healthy. However, your care team may have specific advice just for you. Please talk to your care team about your preventive care needs.  Nutrition  Eat 5 or more servings of fruits and vegetables each day.  Try wheat bread, brown rice and whole grain pasta (instead of white bread, rice, and pasta).  Get enough calcium and vitamin D. Check the label on foods and aim for 100% of the RDA (recommended daily allowance).  Lifestyle  Exercise at least 150 minutes each week  (30 minutes a day, 5 days a week).  Do muscle strengthening activities 2 days a week. These help control your weight and prevent disease.  No smoking.  Wear sunscreen to prevent skin cancer.  Have a dental exam and cleaning every 6 months.  Yearly exams  See your health care team every year to talk about:  Any changes in your health.  Any medicines your care team has prescribed.  Preventive care, family planning, and ways to prevent chronic diseases.  Shots (vaccines)   HPV shots (up to age 26), if you've never had them before.  Hepatitis B shots (up to age 59), if you've never had them before.  COVID-19 shot: Get this shot when it's due.  Flu shot: Get a flu shot every year.  Tetanus shot: Get a tetanus shot every 10 years.  Pneumococcal, hepatitis A, and RSV shots: Ask your care team if you need these based on your risk.  Shingles shot (for age 50 and up)  General health tests  Diabetes screening:  Starting at age 35, Get screened for diabetes at least every 3 years.  If you are younger than age 35, ask your care team if you should be screened for diabetes.  Cholesterol test: At age 39, start having a cholesterol test every 5 years, or more often if advised.  Bone density scan (DEXA): At age 50, ask your care team if you should have this scan for osteoporosis (brittle bones).  Hepatitis C: Get tested at least once in your life.  STIs (sexually  transmitted infections)  Before age 24: Ask your care team if you should be screened for STIs.  After age 24: Get screened for STIs if you're at risk. You are at risk for STIs (including HIV) if:  You are sexually active with more than one person.  You don't use condoms every time.  You or a partner was diagnosed with a sexually transmitted infection.  If you are at risk for HIV, ask about PrEP medicine to prevent HIV.  Get tested for HIV at least once in your life, whether you are at risk for HIV or not.  Cancer screening tests  Cervical cancer screening: If you have a cervix, begin getting regular cervical cancer screening tests starting at age 21.  Breast cancer scan (mammogram): If you've ever had breasts, begin having regular mammograms starting at age 40. This is a scan to check for breast cancer.  Colon cancer screening: It is important to start screening for colon cancer at age 45.  Have a colonoscopy test every 10 years (or more often if you're at risk) Or, ask your provider about stool tests like a FIT test every year or Cologuard test every 3 years.  To learn more about your testing options, visit:   .  For help making a decision, visit:   https://bit.ly/yc18229.  Prostate cancer screening test: If you have a prostate, ask your care team if a prostate cancer screening test (PSA) at age 55 is right for you.  Lung cancer screening: If you are a current or former smoker ages 50 to 80, ask your care team if ongoing lung cancer screenings are right for you.  For informational purposes only. Not to replace the advice of your health care provider. Copyright   2023 [facility]. All rights reserved. Clinically reviewed by the [facility]itions [facility]. [employer] [id number] - REV 01/24.  Learning About Depression Screening  What is depression screening?  Depression screening is a way to see if you have depression symptoms. It may be done by a doctor or counselor. It's often part of a routine  "checkup. That's because your mental health is just as important as your physical health.  Depression is a mental health condition that affects how you feel, think, and act. You may:  Have less energy.  Lose interest in your daily activities.  Feel sad and grouchy for a long time.  Depression is very common. It affects people of all ages.  Many things can lead to depression. Some people become depressed after they have a stroke or find out they have a major illness like cancer or heart disease. The death of a loved one or a breakup may lead to depression. It can run in families. Most experts believe that a combination of inherited genes and stressful life events can cause it.  What happens during screening?  You may be asked to fill out a form about your depression symptoms. You and the doctor will discuss your answers. The doctor may ask you more questions to learn more about how you think, act, and feel.  What happens after screening?  If you have symptoms of depression, your doctor will talk to you about your options.  Doctors usually treat depression with medicines or counseling. Often, combining the two works best. Many people don't get help because they think that they'll get over the depression on their own. But people with depression may not get better unless they get treatment.  The cause of depression is not well understood. There may be many factors involved. But if you have depression, it's not your fault.  A serious symptom of depression is thinking about death or suicide. If you or someone you care about talks about this or about feeling hopeless, get help right away.  It's important to know that depression can be treated. Medicine, counseling, and self-care may help.  Where can you learn more?  Go to https://www.healthwise.net/patiented  Enter T185 in the search box to learn more about \"Learning About Depression Screening.\"  Current as of: July 31, 2024  Content Version: 14.4    3738-0469 Chay " [email or web address], [employer].   Care instructions adapted under license by your healthcare professional. If you have questions about a medical condition or this instruction, always ask your healthcare professional. [facility] [email or web address] disclaims any warranty or liability for your use of this information.

## 2025-05-21 NOTE — PROGRESS NOTES
"Preventive Care Visit  [facility]  Sulaiman Guo MD, Family Medicine  May 21, 2025      Assessment & Plan     Routine general medical examination at a health care facility  No concerns today.   Reviewed last year's labs, patient declines repeat at this time and elects to monitor every other year.  Will need to repeat CMP, A1c and lipid panel next year along with Pap smear.  General preventative counselling as noted below.   Follow up in 1 year for next  examination or sooner if needed.    PCOS (polycystic ovarian syndrome)  Discussed with patient that Celexa is associated with slight weight gain however metformin can help with weight loss.   Review of chart shows 2 pound weight gain from last noted weight in 2023 however patient has noticed more central/abdominal weight gain.  Suspect more related to PCOS than medication side effect.    Discussed with patient we do have room to go up on metformin however she prefers to stay at 500 mg dose for now  We will continue to work on lifestyle modifications including heart healthy diet and at least 150 minutes of moderate intensity exercise per week  Will recheck in 1 years time  - metFORMIN (GLUCOPHAGE) 500 MG tablet; Take 1 tablet (500 mg) by mouth daily (with breakfast).    Episode of recurrent major depressive disorder, unspecified depression episode severity  - citalopram (CELEXA) 10 MG tablet; Take 1 tablet (10 mg) by mouth daily.    Dyslipidemia  Will plan to repeat lipid panel next year            BMI  Estimated body mass index is 31.5 kg/m  as calculated from the following:    Height as of this encounter: 1.61 m (5' 3.39\").    Weight as of this encounter: 81.6 kg (180 lb).       Counseling  Appropriate preventive services were addressed with this patient via screening, questionnaire, or discussion as appropriate for fall prevention, nutrition, physical activity, Tobacco-use cessation, social engagement, weight loss and cognition.  " Checklist reviewing preventive services available has been given to the patient.  Reviewed patient's diet, addressing concerns and/or questions.   The patient's PHQ-9 score is consistent with mild depression. She was provided with information regarding depression.           Donnell Murdock is a 29 year old, presenting for the following:  Physical and PCOS         2025     7:03 AM   Additional Questions   Roomed by Kendra Campos        HPI    30 y/o Female here for HM examination  No concerns today    History of depression, PCOS with insulin resistance, dyslipidemia  On medications as noted in chart  Reports mood is stable  Needs refills today  Has question regarding weight gain as possible SE of medication    LMP: Currently on period, have been irregular since getting off birth control about a year ago, starting to get more regular    Contraception: None  STI screening: Sexually active with , only partner, no concerns for STIs and declines testing  Mammogram: Will begin breast cancer screening at age 40.  No increased risk factors.          Colon cancer screen: Will begin colon cancer screening at age 45.  No increased risk factors.    Work: Works as a  at a tech company/  Diet: Tries to eat a varied and balanced diet-lots of fruits, whole grains, healthy amount of meat/chicken - lean cuts, pork, veggies   Exercise: 10 mins brisk walking daily, lifts weight 1 hour couple times per week    Dental: Sees dentist regularly  Vision: Sees eye doctor regularly    Immunizations: UTD          Advance Care Planning    Discussed advance care planning with patient; however, patient declined at this time.        2025   General Health   How would you rate your overall physical health? Good   Feel stress (tense, anxious, or unable to sleep) Only a little   (!) STRESS CONCERN      2025   Nutrition   Three or more servings of calcium each day? Yes   Diet: Regular (no  restrictions)   How many servings of fruit and vegetables per day? (!) 2-3   How many sweetened beverages each day? 0-1         5/20/2025   Exercise   Days per week of moderate/strenous exercise 4 days   Average minutes spent exercising at this level 20 min         5/20/2025   Social Factors   Frequency of gathering with friends or relatives Once a week   Worry food won't last until get money to buy more No   Food not last or not have enough money for food? No   Do you have housing? (Housing is defined as stable permanent housing and does not include staying outside in a car, in a tent, in an abandoned building, in an overnight shelter, or couch-surfing.) Yes   Are you worried about losing your housing? No   Lack of transportation? No   Unable to get utilities (heat,electricity)? No         5/20/2025   Dental   Dentist two times every year? Yes       Today's PHQ-9 Score:       5/20/2025     8:03 AM   PHQ-9 SCORE   PHQ-9 Total Score MyChart 7 (Mild depression)   PHQ-9 Total Score 7        Patient-reported         5/20/2025   Substance Use   Alcohol more than 3/day or more than 7/wk No   Do you use any other substances recreationally? No     Social History     Tobacco Use    Smoking status: Former     Current packs/day: 0.00     Types: Cigarettes    Smokeless tobacco: Never   Vaping Use    Vaping status: Never Used   Substance Use Topics    Alcohol use: Not Currently     Comment: NOT everyday. Casual    Drug use: Never           12/27/2022   LAST FHS-7 RESULTS   1st degree relative breast or ovarian cancer No   Any relative bilateral breast cancer Unknown   Any male have breast cancer No   Any ONE woman have BOTH breast AND ovarian cancer Yes   Any woman with breast cancer before 50yrs Unknown   2 or more relatives with breast AND/OR ovarian cancer Unknown   2 or more relatives with breast AND/OR bowel cancer Unknown                5/20/2025   STI Screening   New sexual partner(s) since last STI/HIV test? No  "    History of abnormal Pap smear: No - age 30-64 HPV with reflex Pap every 5 years recommended        1/2/2023     3:22 PM 11/18/2019    10:05 AM   PAP / HPV   PAP Negative for Intraepithelial Lesion or Malignancy (NILM)     PAP (Historical)  NIL            5/20/2025   Contraception/Family Planning   Questions about contraception or family planning No        Reviewed and updated as needed this visit by Provider                             Objective    Exam  /74 (BP Location: Right arm, Patient Position: Sitting, Cuff Size: Adult Regular)   Pulse 61   Temp 97.7  F (36.5  C) (Temporal)   Resp 16   Ht 1.61 m (5' 3.39\")   Wt 81.6 kg (180 lb)   SpO2 100%   BMI 31.50 kg/m     Estimated body mass index is 31.5 kg/m  as calculated from the following:    Height as of this encounter: 1.61 m (5' 3.39\").    Weight as of this encounter: 81.6 kg (180 lb).    Physical Exam  GENERAL: alert and no distress  EYES: Eyes grossly normal to inspection, PERRL and conjunctivae and sclerae normal  HENT: ear canals and TM's normal, nose and mouth without ulcers or lesions  NECK: no adenopathy, no asymmetry, masses, or scars  RESP: lungs clear to auscultation - no rales, rhonchi or wheezes  CV: regular rate and rhythm, normal S1 S2, no S3 or S4, no murmur, click or rub, no peripheral edema  ABDOMEN: soft, nontender, no hepatosplenomegaly, no masses and bowel sounds normal  MS: no gross musculoskeletal defects noted, no edema  SKIN: no suspicious lesions or rashes  NEURO: Normal strength and tone, mentation intact and speech normal  PSYCH: mentation appears normal, affect normal/bright        Signed Electronically by: Sulaiman Guo MD    Answers submitted by the patient for this visit:  Patient Health Questionnaire (Submitted on 5/20/2025)  If you checked off any problems, how difficult have these problems made it for you to do your work, take care of things at home, or get along with other people?: Somewhat difficult  PHQ9 " TOTAL SCORE: 7